# Patient Record
Sex: MALE | Race: WHITE | NOT HISPANIC OR LATINO
[De-identification: names, ages, dates, MRNs, and addresses within clinical notes are randomized per-mention and may not be internally consistent; named-entity substitution may affect disease eponyms.]

---

## 2017-04-02 ENCOUNTER — RESULT REVIEW (OUTPATIENT)
Age: 52
End: 2017-04-02

## 2017-04-03 ENCOUNTER — APPOINTMENT (OUTPATIENT)
Dept: GASTROENTEROLOGY | Facility: HOSPITAL | Age: 52
End: 2017-04-03

## 2017-04-03 ENCOUNTER — OUTPATIENT (OUTPATIENT)
Dept: OUTPATIENT SERVICES | Facility: HOSPITAL | Age: 52
LOS: 1 days | End: 2017-04-03
Payer: COMMERCIAL

## 2017-04-03 DIAGNOSIS — Z12.11 ENCOUNTER FOR SCREENING FOR MALIGNANT NEOPLASM OF COLON: ICD-10-CM

## 2017-04-03 DIAGNOSIS — D12.6 BENIGN NEOPLASM OF COLON, UNSPECIFIED: ICD-10-CM

## 2017-04-03 DIAGNOSIS — K21.9 GASTRO-ESOPHAGEAL REFLUX DISEASE WITHOUT ESOPHAGITIS: ICD-10-CM

## 2017-04-03 PROCEDURE — 88305 TISSUE EXAM BY PATHOLOGIST: CPT

## 2017-04-03 PROCEDURE — 45380 COLONOSCOPY AND BIOPSY: CPT | Mod: PT

## 2017-04-03 PROCEDURE — 88305 TISSUE EXAM BY PATHOLOGIST: CPT | Mod: 26

## 2017-04-03 PROCEDURE — 45380 COLONOSCOPY AND BIOPSY: CPT

## 2017-04-03 PROCEDURE — 43235 EGD DIAGNOSTIC BRUSH WASH: CPT

## 2017-04-04 LAB — SURGICAL PATHOLOGY STUDY: SIGNIFICANT CHANGE UP

## 2017-04-05 PROBLEM — D12.6 COLON ADENOMA: Status: ACTIVE | Noted: 2017-04-05

## 2017-04-27 ENCOUNTER — OTHER (OUTPATIENT)
Age: 52
End: 2017-04-27

## 2017-06-14 ENCOUNTER — MEDICATION RENEWAL (OUTPATIENT)
Age: 52
End: 2017-06-14

## 2018-01-16 ENCOUNTER — MEDICATION RENEWAL (OUTPATIENT)
Age: 53
End: 2018-01-16

## 2018-02-28 ENCOUNTER — APPOINTMENT (OUTPATIENT)
Dept: OTHER | Facility: CLINIC | Age: 53
End: 2018-02-28
Payer: COMMERCIAL

## 2018-02-28 PROCEDURE — 99396 PREV VISIT EST AGE 40-64: CPT

## 2018-02-28 PROCEDURE — 94010 BREATHING CAPACITY TEST: CPT

## 2018-02-28 PROCEDURE — 99214 OFFICE O/P EST MOD 30 MIN: CPT | Mod: 25

## 2018-02-28 PROCEDURE — 96150: CPT

## 2018-02-28 RX ORDER — COLCHICINE 0.6 MG/1
0.6 TABLET ORAL
Qty: 60 | Refills: 0 | Status: COMPLETED | COMMUNITY
Start: 2017-06-13

## 2018-02-28 RX ORDER — HYDROXYCHLOROQUINE SULFATE 200 MG/1
200 TABLET, FILM COATED ORAL
Qty: 60 | Refills: 0 | Status: ACTIVE | COMMUNITY
Start: 2017-08-11

## 2018-02-28 RX ORDER — ACETAMINOPHEN AND CODEINE PHOSPHATE 120; 12 MG/5ML; MG/5ML
120-12 SOLUTION ORAL
Qty: 473 | Refills: 0 | Status: DISCONTINUED | COMMUNITY
Start: 2017-10-02

## 2018-02-28 RX ORDER — LEVOTHYROXINE SODIUM 0.03 MG/1
25 TABLET ORAL
Qty: 90 | Refills: 0 | Status: ACTIVE | COMMUNITY
Start: 2017-05-05

## 2018-02-28 RX ORDER — GABAPENTIN 600 MG/1
600 TABLET, COATED ORAL
Qty: 60 | Refills: 0 | Status: ACTIVE | COMMUNITY
Start: 2017-08-11

## 2018-03-01 LAB
ALBUMIN SERPL ELPH-MCNC: 4.6 G/DL
ALP BLD-CCNC: 95 U/L
ALT SERPL-CCNC: 33 U/L
ANION GAP SERPL CALC-SCNC: 11 MMOL/L
APPEARANCE: CLEAR
AST SERPL-CCNC: 25 U/L
BASOPHILS # BLD AUTO: 0.01 K/UL
BASOPHILS NFR BLD AUTO: 0.2 %
BILIRUB SERPL-MCNC: 0.4 MG/DL
BILIRUBIN URINE: NEGATIVE
BLOOD URINE: NEGATIVE
BUN SERPL-MCNC: 13 MG/DL
CALCIUM SERPL-MCNC: 10 MG/DL
CHLORIDE SERPL-SCNC: 101 MMOL/L
CHOLEST SERPL-MCNC: 166 MG/DL
CHOLEST/HDLC SERPL: 3.6 RATIO
CO2 SERPL-SCNC: 25 MMOL/L
COLOR: ABNORMAL
CREAT SERPL-MCNC: 0.8 MG/DL
EOSINOPHIL # BLD AUTO: 0.05 K/UL
EOSINOPHIL NFR BLD AUTO: 0.9 %
GLUCOSE QUALITATIVE U: NEGATIVE MG/DL
GLUCOSE SERPL-MCNC: 100 MG/DL
HCT VFR BLD CALC: 44.2 %
HDLC SERPL-MCNC: 46 MG/DL
HGB BLD-MCNC: 14.6 G/DL
IMM GRANULOCYTES NFR BLD AUTO: 0.2 %
KETONES URINE: NEGATIVE
LDLC SERPL CALC-MCNC: 102 MG/DL
LEUKOCYTE ESTERASE URINE: NEGATIVE
LYMPHOCYTES # BLD AUTO: 0.88 K/UL
LYMPHOCYTES NFR BLD AUTO: 15.5 %
MAN DIFF?: NORMAL
MCHC RBC-ENTMCNC: 29.7 PG
MCHC RBC-ENTMCNC: 33 GM/DL
MCV RBC AUTO: 90 FL
MONOCYTES # BLD AUTO: 0.19 K/UL
MONOCYTES NFR BLD AUTO: 3.4 %
NEUTROPHILS # BLD AUTO: 4.52 K/UL
NEUTROPHILS NFR BLD AUTO: 79.8 %
NITRITE URINE: NEGATIVE
PH URINE: 7
PLATELET # BLD AUTO: 190 K/UL
POTASSIUM SERPL-SCNC: 4.6 MMOL/L
PROT SERPL-MCNC: 7.9 G/DL
PROTEIN URINE: NEGATIVE MG/DL
RBC # BLD: 4.91 M/UL
RBC # FLD: 13.9 %
SODIUM SERPL-SCNC: 137 MMOL/L
SPECIFIC GRAVITY URINE: 1.01
TRIGL SERPL-MCNC: 89 MG/DL
UROBILINOGEN URINE: NEGATIVE MG/DL
WBC # FLD AUTO: 5.66 K/UL

## 2018-03-21 ENCOUNTER — APPOINTMENT (OUTPATIENT)
Dept: PSYCHIATRY | Facility: CLINIC | Age: 53
End: 2018-03-21

## 2018-04-11 ENCOUNTER — APPOINTMENT (OUTPATIENT)
Dept: PULMONOLOGY | Facility: CLINIC | Age: 53
End: 2018-04-11

## 2018-04-11 ENCOUNTER — APPOINTMENT (OUTPATIENT)
Dept: CT IMAGING | Facility: IMAGING CENTER | Age: 53
End: 2018-04-11

## 2018-04-19 ENCOUNTER — MEDICATION RENEWAL (OUTPATIENT)
Age: 53
End: 2018-04-19

## 2018-04-20 ENCOUNTER — INPATIENT (INPATIENT)
Facility: HOSPITAL | Age: 53
LOS: 3 days | Discharge: HOME | DRG: 247 | End: 2018-04-24
Attending: HOSPITALIST | Admitting: STUDENT IN AN ORGANIZED HEALTH CARE EDUCATION/TRAINING PROGRAM
Payer: MEDICARE

## 2018-04-20 VITALS
OXYGEN SATURATION: 100 % | SYSTOLIC BLOOD PRESSURE: 131 MMHG | DIASTOLIC BLOOD PRESSURE: 95 MMHG | HEART RATE: 76 BPM | RESPIRATION RATE: 18 BRPM

## 2018-04-20 DIAGNOSIS — I21.19 ST ELEVATION (STEMI) MYOCARDIAL INFARCTION INVOLVING OTHER CORONARY ARTERY OF INFERIOR WALL: ICD-10-CM

## 2018-04-20 DIAGNOSIS — M32.9 SYSTEMIC LUPUS ERYTHEMATOSUS, UNSPECIFIED: ICD-10-CM

## 2018-04-20 DIAGNOSIS — E03.9 HYPOTHYROIDISM, UNSPECIFIED: ICD-10-CM

## 2018-04-20 DIAGNOSIS — Z29.9 ENCOUNTER FOR PROPHYLACTIC MEASURES, UNSPECIFIED: ICD-10-CM

## 2018-04-20 DIAGNOSIS — M10.9 GOUT, UNSPECIFIED: ICD-10-CM

## 2018-04-20 DIAGNOSIS — E78.5 HYPERLIPIDEMIA, UNSPECIFIED: ICD-10-CM

## 2018-04-20 DIAGNOSIS — J98.4 OTHER DISORDERS OF LUNG: ICD-10-CM

## 2018-04-20 LAB
ALBUMIN SERPL ELPH-MCNC: 4.2 G/DL — SIGNIFICANT CHANGE UP (ref 3.3–5)
ALBUMIN SERPL ELPH-MCNC: 4.5 G/DL — SIGNIFICANT CHANGE UP (ref 3.3–5)
ALP SERPL-CCNC: 90 U/L — SIGNIFICANT CHANGE UP (ref 40–120)
ALP SERPL-CCNC: 96 U/L — SIGNIFICANT CHANGE UP (ref 40–120)
ALT FLD-CCNC: 24 U/L — SIGNIFICANT CHANGE UP (ref 10–45)
ALT FLD-CCNC: 26 U/L — SIGNIFICANT CHANGE UP (ref 10–45)
ANION GAP SERPL CALC-SCNC: 11 MMOL/L — SIGNIFICANT CHANGE UP (ref 5–17)
ANION GAP SERPL CALC-SCNC: 15 MMOL/L — SIGNIFICANT CHANGE UP (ref 5–17)
APTT BLD: 26.5 SEC — LOW (ref 27.5–37.4)
AST SERPL-CCNC: 21 U/L — SIGNIFICANT CHANGE UP (ref 10–40)
AST SERPL-CCNC: 22 U/L — SIGNIFICANT CHANGE UP (ref 10–40)
BASE EXCESS BLDV CALC-SCNC: 4 MMOL/L — HIGH (ref -2–2)
BASOPHILS # BLD AUTO: 0 K/UL — SIGNIFICANT CHANGE UP (ref 0–0.2)
BASOPHILS NFR BLD AUTO: 0.4 % — SIGNIFICANT CHANGE UP (ref 0–2)
BILIRUB SERPL-MCNC: 0.3 MG/DL — SIGNIFICANT CHANGE UP (ref 0.2–1.2)
BILIRUB SERPL-MCNC: 0.3 MG/DL — SIGNIFICANT CHANGE UP (ref 0.2–1.2)
BUN SERPL-MCNC: 18 MG/DL — SIGNIFICANT CHANGE UP (ref 7–23)
BUN SERPL-MCNC: 19 MG/DL — SIGNIFICANT CHANGE UP (ref 7–23)
CA-I SERPL-SCNC: 1.21 MMOL/L — SIGNIFICANT CHANGE UP (ref 1.12–1.3)
CALCIUM SERPL-MCNC: 9.5 MG/DL — SIGNIFICANT CHANGE UP (ref 8.4–10.5)
CALCIUM SERPL-MCNC: 9.9 MG/DL — SIGNIFICANT CHANGE UP (ref 8.4–10.5)
CHLORIDE BLDV-SCNC: 106 MMOL/L — SIGNIFICANT CHANGE UP (ref 96–108)
CHLORIDE SERPL-SCNC: 102 MMOL/L — SIGNIFICANT CHANGE UP (ref 96–108)
CHLORIDE SERPL-SCNC: 104 MMOL/L — SIGNIFICANT CHANGE UP (ref 96–108)
CK MB BLD-MCNC: 9.8 % — HIGH (ref 0–3.5)
CK MB CFR SERPL CALC: 3.5 NG/ML — SIGNIFICANT CHANGE UP (ref 0–6.7)
CK MB CFR SERPL CALC: 9.8 NG/ML — HIGH (ref 0–6.7)
CK SERPL-CCNC: 100 U/L — SIGNIFICANT CHANGE UP (ref 30–200)
CK SERPL-CCNC: 68 U/L — SIGNIFICANT CHANGE UP (ref 30–200)
CO2 BLDV-SCNC: 28 MMOL/L — SIGNIFICANT CHANGE UP (ref 22–30)
CO2 SERPL-SCNC: 23 MMOL/L — SIGNIFICANT CHANGE UP (ref 22–31)
CO2 SERPL-SCNC: 24 MMOL/L — SIGNIFICANT CHANGE UP (ref 22–31)
CREAT SERPL-MCNC: 0.83 MG/DL — SIGNIFICANT CHANGE UP (ref 0.5–1.3)
CREAT SERPL-MCNC: 0.86 MG/DL — SIGNIFICANT CHANGE UP (ref 0.5–1.3)
EOSINOPHIL # BLD AUTO: 0.1 K/UL — SIGNIFICANT CHANGE UP (ref 0–0.5)
EOSINOPHIL NFR BLD AUTO: 1.2 % — SIGNIFICANT CHANGE UP (ref 0–6)
GAS PNL BLDV: 138 MMOL/L — SIGNIFICANT CHANGE UP (ref 136–145)
GAS PNL BLDV: SIGNIFICANT CHANGE UP
GAS PNL BLDV: SIGNIFICANT CHANGE UP
GLUCOSE BLDV-MCNC: 145 MG/DL — HIGH (ref 70–99)
GLUCOSE SERPL-MCNC: 135 MG/DL — HIGH (ref 70–99)
GLUCOSE SERPL-MCNC: 142 MG/DL — HIGH (ref 70–99)
HCO3 BLDV-SCNC: 27 MMOL/L — SIGNIFICANT CHANGE UP (ref 21–29)
HCT VFR BLD CALC: 43.3 % — SIGNIFICANT CHANGE UP (ref 39–50)
HCT VFR BLD CALC: 44.6 % — SIGNIFICANT CHANGE UP (ref 39–50)
HCT VFR BLDA CALC: 46 % — SIGNIFICANT CHANGE UP (ref 39–50)
HGB BLD CALC-MCNC: 15 G/DL — SIGNIFICANT CHANGE UP (ref 13–17)
HGB BLD-MCNC: 14.9 G/DL — SIGNIFICANT CHANGE UP (ref 13–17)
HGB BLD-MCNC: 15.1 G/DL — SIGNIFICANT CHANGE UP (ref 13–17)
INR BLD: 1.04 RATIO — SIGNIFICANT CHANGE UP (ref 0.88–1.16)
LACTATE BLDV-MCNC: 2 MMOL/L — SIGNIFICANT CHANGE UP (ref 0.7–2)
LYMPHOCYTES # BLD AUTO: 1 K/UL — SIGNIFICANT CHANGE UP (ref 1–3.3)
LYMPHOCYTES # BLD AUTO: 14.8 % — SIGNIFICANT CHANGE UP (ref 13–44)
MCHC RBC-ENTMCNC: 31.2 PG — SIGNIFICANT CHANGE UP (ref 27–34)
MCHC RBC-ENTMCNC: 32.1 PG — SIGNIFICANT CHANGE UP (ref 27–34)
MCHC RBC-ENTMCNC: 33.5 GM/DL — SIGNIFICANT CHANGE UP (ref 32–36)
MCHC RBC-ENTMCNC: 34.8 GM/DL — SIGNIFICANT CHANGE UP (ref 32–36)
MCV RBC AUTO: 92.4 FL — SIGNIFICANT CHANGE UP (ref 80–100)
MCV RBC AUTO: 93.2 FL — SIGNIFICANT CHANGE UP (ref 80–100)
MONOCYTES # BLD AUTO: 0.3 K/UL — SIGNIFICANT CHANGE UP (ref 0–0.9)
MONOCYTES NFR BLD AUTO: 4.1 % — SIGNIFICANT CHANGE UP (ref 2–14)
NEUTROPHILS # BLD AUTO: 5.1 K/UL — SIGNIFICANT CHANGE UP (ref 1.8–7.4)
NEUTROPHILS NFR BLD AUTO: 79.4 % — HIGH (ref 43–77)
NT-PROBNP SERPL-SCNC: 67 PG/ML — SIGNIFICANT CHANGE UP (ref 0–300)
PCO2 BLDV: 34 MMHG — LOW (ref 35–50)
PH BLDV: 7.5 — HIGH (ref 7.35–7.45)
PLATELET # BLD AUTO: 195 K/UL — SIGNIFICANT CHANGE UP (ref 150–400)
PLATELET # BLD AUTO: 201 K/UL — SIGNIFICANT CHANGE UP (ref 150–400)
PO2 BLDV: 16 MMHG — LOW (ref 25–45)
POTASSIUM BLDV-SCNC: 4.4 MMOL/L — SIGNIFICANT CHANGE UP (ref 3.5–5)
POTASSIUM SERPL-MCNC: 3.9 MMOL/L — SIGNIFICANT CHANGE UP (ref 3.5–5.3)
POTASSIUM SERPL-MCNC: 4.6 MMOL/L — SIGNIFICANT CHANGE UP (ref 3.5–5.3)
POTASSIUM SERPL-SCNC: 3.9 MMOL/L — SIGNIFICANT CHANGE UP (ref 3.5–5.3)
POTASSIUM SERPL-SCNC: 4.6 MMOL/L — SIGNIFICANT CHANGE UP (ref 3.5–5.3)
PROT SERPL-MCNC: 7.4 G/DL — SIGNIFICANT CHANGE UP (ref 6–8.3)
PROT SERPL-MCNC: 7.8 G/DL — SIGNIFICANT CHANGE UP (ref 6–8.3)
PROTHROM AB SERPL-ACNC: 11.3 SEC — SIGNIFICANT CHANGE UP (ref 9.8–12.7)
RBC # BLD: 4.68 M/UL — SIGNIFICANT CHANGE UP (ref 4.2–5.8)
RBC # BLD: 4.78 M/UL — SIGNIFICANT CHANGE UP (ref 4.2–5.8)
RBC # FLD: 11.6 % — SIGNIFICANT CHANGE UP (ref 10.3–14.5)
RBC # FLD: 11.7 % — SIGNIFICANT CHANGE UP (ref 10.3–14.5)
RH IG SCN BLD-IMP: NEGATIVE — SIGNIFICANT CHANGE UP
SAO2 % BLDV: 35 % — LOW (ref 67–88)
SODIUM SERPL-SCNC: 138 MMOL/L — SIGNIFICANT CHANGE UP (ref 135–145)
SODIUM SERPL-SCNC: 141 MMOL/L — SIGNIFICANT CHANGE UP (ref 135–145)
TROPONIN T SERPL-MCNC: 0.02 NG/ML — SIGNIFICANT CHANGE UP (ref 0–0.06)
TROPONIN T SERPL-MCNC: 0.08 NG/ML — HIGH (ref 0–0.06)
WBC # BLD: 6.4 K/UL — SIGNIFICANT CHANGE UP (ref 3.8–10.5)
WBC # BLD: 7.2 K/UL — SIGNIFICANT CHANGE UP (ref 3.8–10.5)
WBC # FLD AUTO: 6.4 K/UL — SIGNIFICANT CHANGE UP (ref 3.8–10.5)
WBC # FLD AUTO: 7.2 K/UL — SIGNIFICANT CHANGE UP (ref 3.8–10.5)

## 2018-04-20 PROCEDURE — 99152 MOD SED SAME PHYS/QHP 5/>YRS: CPT

## 2018-04-20 PROCEDURE — 93458 L HRT ARTERY/VENTRICLE ANGIO: CPT | Mod: 26,59

## 2018-04-20 PROCEDURE — 92941 PRQ TRLML REVSC TOT OCCL AMI: CPT | Mod: RC

## 2018-04-20 PROCEDURE — 99285 EMERGENCY DEPT VISIT HI MDM: CPT

## 2018-04-20 PROCEDURE — 93010 ELECTROCARDIOGRAM REPORT: CPT

## 2018-04-20 PROCEDURE — 99223 1ST HOSP IP/OBS HIGH 75: CPT | Mod: GC

## 2018-04-20 RX ORDER — ASPIRIN/CALCIUM CARB/MAGNESIUM 324 MG
243 TABLET ORAL ONCE
Qty: 0 | Refills: 0 | Status: COMPLETED | OUTPATIENT
Start: 2018-04-20 | End: 2018-04-20

## 2018-04-20 RX ORDER — SODIUM CHLORIDE 9 MG/ML
3 INJECTION INTRAMUSCULAR; INTRAVENOUS; SUBCUTANEOUS ONCE
Qty: 0 | Refills: 0 | Status: COMPLETED | OUTPATIENT
Start: 2018-04-20 | End: 2018-04-20

## 2018-04-20 RX ORDER — ASPIRIN/CALCIUM CARB/MAGNESIUM 324 MG
81 TABLET ORAL DAILY
Qty: 0 | Refills: 0 | Status: DISCONTINUED | OUTPATIENT
Start: 2018-04-21 | End: 2018-04-24

## 2018-04-20 RX ORDER — ATORVASTATIN CALCIUM 80 MG/1
80 TABLET, FILM COATED ORAL AT BEDTIME
Qty: 0 | Refills: 0 | Status: DISCONTINUED | OUTPATIENT
Start: 2018-04-20 | End: 2018-04-24

## 2018-04-20 RX ORDER — TICAGRELOR 90 MG/1
90 TABLET ORAL
Qty: 0 | Refills: 0 | Status: DISCONTINUED | OUTPATIENT
Start: 2018-04-20 | End: 2018-04-24

## 2018-04-20 RX ORDER — TICAGRELOR 90 MG/1
180 TABLET ORAL ONCE
Qty: 0 | Refills: 0 | Status: COMPLETED | OUTPATIENT
Start: 2018-04-20 | End: 2018-04-20

## 2018-04-20 RX ORDER — GABAPENTIN 400 MG/1
600 CAPSULE ORAL
Qty: 0 | Refills: 0 | Status: DISCONTINUED | OUTPATIENT
Start: 2018-04-20 | End: 2018-04-24

## 2018-04-20 RX ORDER — HYDROXYCHLOROQUINE SULFATE 200 MG
200 TABLET ORAL
Qty: 0 | Refills: 0 | Status: DISCONTINUED | OUTPATIENT
Start: 2018-04-20 | End: 2018-04-24

## 2018-04-20 RX ORDER — PANTOPRAZOLE SODIUM 20 MG/1
40 TABLET, DELAYED RELEASE ORAL
Qty: 0 | Refills: 0 | Status: DISCONTINUED | OUTPATIENT
Start: 2018-04-20 | End: 2018-04-24

## 2018-04-20 RX ORDER — HEPARIN SODIUM 5000 [USP'U]/ML
5000 INJECTION INTRAVENOUS; SUBCUTANEOUS ONCE
Qty: 0 | Refills: 0 | Status: COMPLETED | OUTPATIENT
Start: 2018-04-20 | End: 2018-04-20

## 2018-04-20 RX ORDER — LEVOTHYROXINE SODIUM 125 MCG
25 TABLET ORAL DAILY
Qty: 0 | Refills: 0 | Status: DISCONTINUED | OUTPATIENT
Start: 2018-04-20 | End: 2018-04-24

## 2018-04-20 RX ADMIN — Medication 243 MILLIGRAM(S): at 13:50

## 2018-04-20 RX ADMIN — GABAPENTIN 600 MILLIGRAM(S): 400 CAPSULE ORAL at 21:16

## 2018-04-20 RX ADMIN — SODIUM CHLORIDE 3 MILLILITER(S): 9 INJECTION INTRAMUSCULAR; INTRAVENOUS; SUBCUTANEOUS at 13:50

## 2018-04-20 RX ADMIN — HEPARIN SODIUM 5000 UNIT(S): 5000 INJECTION INTRAVENOUS; SUBCUTANEOUS at 13:55

## 2018-04-20 RX ADMIN — TICAGRELOR 180 MILLIGRAM(S): 90 TABLET ORAL at 13:53

## 2018-04-20 RX ADMIN — Medication 200 MILLIGRAM(S): at 21:16

## 2018-04-20 NOTE — H&P ADULT - NSHPREVIEWOFSYSTEMS_GEN_ALL_CORE
CONSTITUTIONAL: No weakness, fevers, chills  HEENT: No visual changes  NECK: No pain or stiffness  RESPIRATORY: +Shortness of breath (when speaking, occurs at baseline); No cough, wheezing, hemoptysis  CARDIOVASCULAR: No chest pain or palpitations; No edema  GASTROINTESTINAL: No abdominal pain, n/v/d/c, melena, hematochezia  GENITOURINARY: No dysuria, urinary frequency or hematuria  NEUROLOGICAL: No headache, no numbness or weakness  SKIN: No rashes or lesions  All other review of systems is negative unless indicated above.

## 2018-04-20 NOTE — H&P ADULT - ASSESSMENT
52M with PMH recently-diagnosed SLE (8/2017) on immunosuppressive therapy, gout, HLD, hypothyroidism, and chronic lung disease presenting with acute chest pain, found to have inferior wall STEMI with 100% occlusion of mRCA, now s/p PCI. Admitted to CCU for close monitoring. 52M with PMH recently-diagnosed SLE (8/2017) on immunosuppressive therapy, gout, HLD, hypothyroidism, and chronic lung disease presenting with acute chest pain, found to have inferior wall STEMI with 100% occlusion of mRCA, now s/p PCI with ISABELLA x1 to mRCA. Admitted to CCU for close monitoring.

## 2018-04-20 NOTE — CHART NOTE - NSCHARTNOTEFT_GEN_A_CORE
Removal of Radial Band    Pulses in the right upper extremity are palpable. The patient was placed in the supine position. The insertion site was identified and the band deflated per protocol. The radial band was removed slowly. Direct pressure was applied for 10 minutes.    Monitoring of the right wrist and both upper extremities including neuro-vascular checks and vital signs every 15 minutes x 4.    Complications: None    Comments: Pulses present. No hemorrhage, hematoma or ecchymosis. Pressure dressing placed.

## 2018-04-20 NOTE — H&P ADULT - NSHPLABSRESULTS_GEN_ALL_CORE
Labs:               15.1   6.4   )-----------( 201      ( 20 Apr 2018 13:50 )             43.3     04-20    141  |  102  |  19  ----------------------------<  142<H>  4.6   |  24  |  0.86    Ca    9.9      20 Apr 2018 13:50    TPro  7.8  /  Alb  4.5  /  TBili  0.3  /  DBili  x   /  AST  21  /  ALT  24  /  AlkPhos  96  04-20    PT/INR - ( 20 Apr 2018 13:50 )   PT: 11.3 sec;   INR: 1.04 ratio    PTT - ( 20 Apr 2018 13:50 )  PTT:26.5 sec      ECG:       Telemetry:       Imaging: Labs:               15.1   6.4   )-----------( 201      ( 20 Apr 2018 13:50 )             43.3     04-20    141  |  102  |  19  ----------------------------<  142<H>  4.6   |  24  |  0.86    Ca    9.9      20 Apr 2018 13:50    TPro  7.8  /  Alb  4.5  /  TBili  0.3  /  DBili  x   /  AST  21  /  ALT  24  /  AlkPhos  96  04-20    PT/INR - ( 20 Apr 2018 13:50 )   PT: 11.3 sec;   INR: 1.04 ratio    PTT - ( 20 Apr 2018 13:50 )  PTT:26.5 sec      ECG:     Telemetry: Sinus rhythm, HR 70s    Imaging: None

## 2018-04-20 NOTE — CONSULT NOTE ADULT - SUBJECTIVE AND OBJECTIVE BOX
Registration Time: 13:35  Initial EC:35  Called by ED: 13:42  Saw patient at bedside: 13:43  Called Cath Attendin:53  Balloon Time: 14:14  Door to Balloon time: 39 mins    Patient seen and evaluated at bedside in ED Critical C    Outpatient Cardiologist: Dr. Frederick Stanford    Chief Complaint: Chest pain x 1.5 hours    HPI: 52M w/ PMHx of SLE, Hypothyroid and HLD who p/w acute onset of 6/10 SSCP that radiated down both arms. He described the pain as a, "muscle pain." This is the first time he has experienced this type of pain. Not worse with inspiration. He took ASA 81 at home. EMS gave NTG x 2 - improved the pain. The patient received , Brilinta 180 and Heparin 5000U in the ED    PMHx:   SLE  HLD  Hypothyroid    PSHx:   L wrist surgery    Home Meds: Gabapentin 600 bid, Prednisone 2.5 daily, Levothyroxine 25mcg, Plaquenil 200 daily, Simvastatin 40, Omeprazole 40, Meloxicam 15, ASA 81    Current Meds:   aspirin  chewable 243 milliGRAM(s) Oral once  heparin  Injectable 5000 Unit(s) IV Push once  sodium chloride 0.9% lock flush 3 milliLiter(s) IV Push once  ticagrelor 180 milliGRAM(s) Oral once    Allergies: No Known Allergies    FAMILY HISTORY: Noncontributory    Social History: Retired - was a  and a   Smoking History: denied  Alcohol Use: denied  Drug Use: denied    REVIEW OF SYSTEMS:  CONSTITUTIONAL: No weakness, fevers or chills  EYES/ENT: No visual changes;  No dysphagia  NECK: No pain or stiffness  RESPIRATORY: No cough, wheezing, hemoptysis; No shortness of breath  CARDIOVASCULAR: + chest pain or palpitations; No lower extremity edema  GASTROINTESTINAL: No abdominal or epigastric pain. No nausea, vomiting, or hematemesis; No diarrhea or constipation. No melena or hematochezia.  BACK: No back pain  GENITOURINARY: No dysuria, frequency or hematuria  NEUROLOGICAL: No numbness or weakness  SKIN: No itching, burning, rashes, or lesions   All other review of systems is negative unless indicated above.    Physical Exam:  T(F): no Temp documented  HR: 76 (on my exam)  BP: 141/85 (on my exam)  RR: 24 (on my exam)  SpO2: 99% on RA (on my exam)    GENERAL: Pt in a significant amount of pain  HEAD:  Atraumatic, Normocephalic  ENT: EOMI, PERRLA, conjunctiva and sclera clear, Neck supple, No JVD, moist mucosa  CHEST/LUNG: Clear to auscultation bilaterally; No wheeze, equal breath sounds bilaterally   BACK: No spinal tenderness  HEART: Regular rate and rhythm; No murmurs, rubs, or gallops  ABDOMEN: Soft, Nontender, Nondistended; Bowel sounds present  EXTREMITIES:  No clubbing, cyanosis, or edema  PSYCH: Nl behavior, nl affect  NEUROLOGY: AAOx3, non-focal, cranial nerves intact  SKIN: Normal color, No rashes or lesions  LINES: PIV    Cardiovascular Diagnostic Testing:    ECG: Personally reviewed NSR @ 73, nl axis, nl intervals KARLA in II, III, avf, TWI in avL, STD in I    Labs: Personally reviewed                        15.1   6.4   )-----------( 201      ( 2018 13:50 )             43.3   PT/INR - ( 2018 13:50 )   PT: 11.3 sec;   INR: 1.04 ratio      Remainder of labs pending

## 2018-04-20 NOTE — CONSULT NOTE ADULT - ASSESSMENT
52M w/ PMHx of SLE, Hypothyroid and HLD who p/w acute IWSTEMI, s/p ASA, Brilinta and Heparin in ED - taken to cath lab - 100% mRCA, door to balloon 39mins    Plan:    IWSTEMI:  -c/w ASA 81 daily and Brilinta 90 bid (ensure that insurance covers)  -start Lipitor 80 tonight  -B-blocker / ACEI as tolerated  -TTE  -TSH, FLP, HbA1c  -f/u official Cath report  -Admit to CCU    Ijeoma (p): [phone]

## 2018-04-20 NOTE — ED PROVIDER NOTE - OBJECTIVE STATEMENT
Baljinder Jean MD (resident): 52 M w/ Hx of lupus, [facility], who p/w substernal CP x2 hr when taking tires off the car. Rad to bilateral shoulders. A/w diaphoresis. No SOB or syncope or nausea. Took ASA 81 mg prior to arrival. EMS gave NTG x1, found inferior wall STEMI in 1 EKG.

## 2018-04-20 NOTE — H&P ADULT - NSHPPHYSICALEXAM_GEN_ALL_CORE
ICU Vital Signs Last 24 Hrs:  T(C): 36.8 (20 Apr 2018 14:50), Max: 36.8 (20 Apr 2018 14:50)  T(F): 98.2 (20 Apr 2018 14:50), Max: 98.2 (20 Apr 2018 14:50)  HR: 84 (20 Apr 2018 15:15) (84 - 86)  BP: 95/64 (20 Apr 2018 15:15) (94/69 - 98/64)  BP(mean): 74 (20 Apr 2018 15:15) (74 - 82)  ABP: --  ABP(mean): --  RR: 9 (20 Apr 2018 15:15) (9 - 20)  SpO2: 95% (20 Apr 2018 15:15) (95% - 99%)    PHYSICAL EXAM:   General: Comfortable, NAD  HEENT: EOMI, PERRLA, sclera and conjunctiva clear; dentition normal  Neck: Supple, no JVD  Chest/Lungs: Clear to auscultation bilaterally; no rales, rhonchi, wheezing, or rubs  Heart: Regular rate and rhythm; normal S1/S2; no murmurs, rubs, or gallops  Abdomen: Soft, nondistended, nontender, normoactive bowel sounds  Extremities: PT/DP pulses 2+ bilaterally, no LE edema, clubbing or cyanosis  Skin: Warm, well-perfused; no rashes or lesions  Neurologic:  AOx3, motor strength 5/5 in B/L UE and LE, sensation grossly intact  Psych: Appropriate mood and affect ICU Vital Signs Last 24 Hrs:  T(C): 36.8 (20 Apr 2018 14:50), Max: 36.8 (20 Apr 2018 14:50)  T(F): 98.2 (20 Apr 2018 14:50), Max: 98.2 (20 Apr 2018 14:50)  HR: 84 (20 Apr 2018 15:15) (84 - 86)  BP: 95/64 (20 Apr 2018 15:15) (94/69 - 98/64)  BP(mean): 74 (20 Apr 2018 15:15) (74 - 82)  ABP: --  ABP(mean): --  RR: 9 (20 Apr 2018 15:15) (9 - 20)  SpO2: 95% (20 Apr 2018 15:15) (95% - 99%)    PHYSICAL EXAM:   General: Comfortable, NAD  HEENT: EOMI, PERRLA, sclera and conjunctiva clear; dentition normal  Neck: Supple, no JVD  Chest/Lungs: Clear to auscultation bilaterally; no rales, rhonchi, wheezing, or rubs  Heart: Regular rate and rhythm; normal S1/S2; no murmurs, rubs, or gallops  Abdomen: Soft, nondistended, nontender, normoactive bowel sounds  Extremities: PT/DP pulses 2+ bilaterally, no LE edema, clubbing or cyanosis  Skin: Warm, well-perfused; R radial band in place  Neurologic: AOx3, motor strength 5/5 in B/L UE and LE, sensation grossly intact  Psych: Appropriate mood and affect

## 2018-04-20 NOTE — H&P ADULT - PROBLEM SELECTOR PLAN 2
- Continue prednisone 2.5mg daily, hydroxychloroquine 200mg BID - Continue prednisone 2.5mg daily  - Resume hydroxychloroquine 200mg BID tomorrow

## 2018-04-20 NOTE — ED PROVIDER NOTE - ATTENDING CONTRIBUTION TO CARE
pt is a 53 y/o male with h/o sle, lung disease from 9/11 with chest pain with pleuritic chest pain while changing tires, but pleurititc in nature, ekg prehospital last one with elevation with asa given, diaphoretic prehospital with recurrent chest pain, first ekg with no stemi second with inf wall elevation, cards fellow aware for cath lab for stemi.

## 2018-04-20 NOTE — ED ADULT NURSE NOTE - OBJECTIVE STATEMENT
52 year old male alert and oriented x4 came to ED c/o pain across his chest and radiating to b/l arms.  Patient said pain began about an hour and a half prior to arrival.  Patient was working in his garage and was moving a tire when the pain began.  Patient said pain is 7/10 sharp pain and chest is tender to palpation.   Patient received 1 nitro by EMS and said pain has improved right after, but pain returned.  IV established by EMS in left hand #20 and IV established in ED in right AC #18.  Patient given meds as verbally ordered by Cardiologist.  Cardiology to bedside and patient transported on CM with EDT, Cardiologist and RN to Cath Lab.

## 2018-04-20 NOTE — H&P ADULT - PROBLEM SELECTOR PLAN 1
Inferior wall STEMI s/p PCI. Currently asymptomatic. Hemodynamically stable.   - Continue ASA 81mg daily, ticagrelor 90mg BID  - Will start atorvastatin 80mg tonight  - TTE pending Inferior wall STEMI s/p PCI with ISABELLA x1 to mRCA. Currently asymptomatic. Hemodynamically stable.   - Continue ASA 81mg daily, ticagrelor 90mg BID  - Will start atorvastatin 80mg tonight  - TTE ordered  - Telemetry monitoring Inferior wall STEMI s/p PCI with ISABELLA x1 to mRCA. Currently asymptomatic. Hemodynamically stable.   - Continue ASA 81mg daily, ticagrelor 90mg BID  - Will start atorvastatin 80mg tonight  - TTE ordered  - Telemetry monitoring  - Trend cardiac enzymes  - Plan for staged cath of LAD in upcoming days Inferior wall STEMI s/p PCI with ISABELLA x1 to mRCA. Currently asymptomatic. Hemodynamically stable.   - Continue ASA 81mg daily, ticagrelor 90mg BID  - Will start atorvastatin 80mg tonight  - TTE ordered  - Telemetry monitoring  - Trend cardiac enzymes  - Plan for staged cath of LAD in setting of SLE prior to discharge

## 2018-04-20 NOTE — H&P ADULT - PMH
Chronic lung disease  [place] of work as  in 9/11  Gout    Hyperlipidemia, unspecified hyperlipidemia type    Hypothyroidism, unspecified type    Systemic lupus erythematosus, unspecified SLE type, unspecified organ involvement status

## 2018-04-20 NOTE — H&P ADULT - HISTORY OF PRESENT ILLNESS
52M with PMH recently-diagnosed SLE (8/2017) on immunosuppressive therapy, gout, hypothyroidism who presents from home with acute chest pain. 52M with PMH recently-diagnosed SLE (8/2017) on immunosuppressive therapy, gout, HLD, hypothyroidism, and chronic lung disease from work as  in 9/11 brought in by EMS from home with acute chest pain. Patient was outside changing the tires on his car when he developed dull 6/10 substernal chest pain that radiated down both arms. He continued to work outside for approx. 30 minutes before becoming diaphoretic prompting him to go inside to rest. He initially thought his pain was "muscle pain", however, when it did not resolve with rest, he took a baby aspirin and called EMS. En route, he was given NTG x2 with some degree of relief.     Upon arrival to [facility] ED, he was noted to be in moderate distress d/t pain. ECG on arrival showed NSR, HR 70 with no acute ischemic changes. Cardiology was called for persistent sxs. Repeat ECG revealed new ST elevations in II, III, aVF and TWI in aVL, ST depressions in lead I. He was given ASA 243mg, Brilinta 180mg and 5000 units heparin and brought emergently to cath lab for inferior wall STEMI where he was found to have 100% mRCA occlusion. He was revascularized and brought to CCU for close monitoring post-MI. 52M with PMH recently-diagnosed SLE (8/2017) on immunosuppressive therapy, gout, HLD, hypothyroidism, and chronic lung disease from work as  in 9/11 brought in by EMS from home with acute chest pain. Patient was outside changing the tires on his car when he developed dull 6/10 chest pain that radiated across his chest and down both arms. He continued to work outside for approx. 30 minutes before becoming diaphoretic prompting him to go inside to rest. He initially thought his pain was "muscle pain", however, when it did not resolve with rest, he took a baby aspirin and called EMS. En route, he was given NTG x1 with some degree of relief.     Upon arrival to [facility] ED, he was noted to be in moderate distress d/t pain. ECG showed NSR, HR 70 with no acute ischemic changes. Cardiology was called for persistent sxs. Repeat ECG revealed new ST elevations in II, III, aVF and TWI in aVL, ST depressions in lead I. He was given ASA 243mg, Brilinta 180mg and 5000 units heparin, and brought emergently to cath lab for inferior wall STEMI where he was found to have 100% mRCA occlusion. He was revascularized and brought to CCU for close monitoring post-MI.

## 2018-04-20 NOTE — ED PROVIDER NOTE - PHYSICAL EXAMINATION
Physical Exam: middle aged M who is in moderate distress, no diaphoresis, but tearing eyes, AAOx3, NCAT, MMM, neck is supple, PERRL, CTAB, normal rate and regular rhythm, abdomen is soft and NTND, No edema, No deformity of extremities, No rashes, CN grossly intact, No focal motor or sensory deficits. Equal pulses in all 4 extremities ~ Baljinder Jean MD

## 2018-04-20 NOTE — CHART NOTE - NSCHARTNOTEFT_GEN_A_CORE
====================  CCU MIDNIGHT ROUNDS  ====================    MATTY ALMARAZ  [id number]  Patient is a 52y old  Male who presents with a chief complaint of Chest pain, STEMI (20 Apr 2018 15:13)      ====================  SUMMARY: 52M with PMH recently-diagnosed SLE (8/2017) on immunosuppressive therapy, gout, HLD, hypothyroidism, and chronic lung disease presenting with acute chest pain, found to have inferior wall STEMI with 100% occlusion of mRCA, now s/p PCI with ISABELLA x1 to mRCA. Admitted to CCU for close monitoring.   ====================        ====================  NEW EVENTS: Patient resting comfortably in bed, watching TV. Denies any chest pain or shortness of breath. Right radial site without any hematoma.   ====================        ====================  VITALS (Last 12 hrs):  ====================    T(C): 36.8 (04-20-18 @ 19:00), Max: 36.8 (04-20-18 @ 14:50)  T(F): 98.2 (04-20-18 @ 19:00), Max: 98.2 (04-20-18 @ 14:50)  HR: 66 (04-20-18 @ 21:00) (64 - 88)  BP: 92/52 (04-20-18 @ 21:00) (92/52 - 141/85)  BP(mean): 64 (04-20-18 @ 21:00) (64 - 84)  ABP: --  ABP(mean): --  RR: 17 (04-20-18 @ 21:00) (14 - 22)  SpO2: 98% (04-20-18 @ 21:00) (95% - 100%)  Wt(kg): --  CVP(mm Hg): --  CVP(cm H2O): --  CO: --  CI: --  PA: --  PA(mean): --  PCWP: --  SVR: --  PVR: --    I&O's Summary    20 Apr 2018 07:01  -  20 Apr 2018 22:25  --------------------------------------------------------  IN: 60 mL / OUT: 1100 mL / NET: -1040 mL            ====================  NEW LABS:  ====================                          14.9   7.2   )-----------( 195      ( 20 Apr 2018 15:54 )             44.6     04-20    138  |  104  |  18  ----------------------------<  135<H>  3.9   |  23  |  0.83    Ca    9.5      20 Apr 2018 15:54    TPro  7.4  /  Alb  4.2  /  TBili  0.3  /  DBili  x   /  AST  22  /  ALT  26  /  AlkPhos  90  04-20    PT/INR - ( 20 Apr 2018 13:50 )   PT: 11.3 sec;   INR: 1.04 ratio         PTT - ( 20 Apr 2018 13:50 )  PTT:26.5 sec  Creatine Kinase, Serum: 100 U/L (04-20-18 @ 15:54)  Troponin T, Serum: 0.08 ng/mL <H> (04-20-18 @ 15:54)  Creatine Kinase, Serum: 68 U/L (04-20-18 @ 13:50)  Troponin T, Serum: 0.02 ng/mL (04-20-18 @ 13:50)    CKMB Units: 9.8 ng/mL (04-20 @ 15:54)  CKMB Units: 3.5 ng/mL (04-20 @ 13:50)          ====================  PLAN:  ====================  - Inferior wall STEMI s/p PCI with ISABELLA x1 to mRCA. Currently asymptomatic. Hemodynamically stable.   - Continue ASA 81mg daily, ticagrelor 90mg BID  - Right radial band removed this evening, no evidence of hematoma.   - Continue with atorvastatin 80mg tonight  - TTE ordered  - Telemetry monitoring  - Trend cardiac enzymes  - Plan for staged cath of LAD in setting of SLE prior to discharge.   - Continue prednisone 2.5mg daily  - Continue hydroxychloroquine 200mg BID    - Continue levothyroxine 25mcg daily.       Vesna Acuña MD  PGY2 Internal Medicine Resident  Pager: [phone] - [place]  [phone] - [facility]

## 2018-04-20 NOTE — ED PROVIDER NOTE - PROGRESS NOTE DETAILS
Baljinder Jean MD (resident): 2nd EMS EKG w/ ST elevation in inferior leads. 1st ED EKG w/o STEMI, but pt still symptomatic, so cards called. 2nd ED EKG w/ STEMI, taken to cath.

## 2018-04-20 NOTE — H&P ADULT - NSHPSOCIALHISTORY_GEN_ALL_CORE
Lives at home with wife and children. Retired . Denies prior or current tobacco use. Occasional EtOH use. No illicit drug use.

## 2018-04-21 LAB
ALBUMIN SERPL ELPH-MCNC: 4 G/DL — SIGNIFICANT CHANGE UP (ref 3.3–5)
ALP SERPL-CCNC: 92 U/L — SIGNIFICANT CHANGE UP (ref 40–120)
ALT FLD-CCNC: 28 U/L — SIGNIFICANT CHANGE UP (ref 10–45)
ANION GAP SERPL CALC-SCNC: 12 MMOL/L — SIGNIFICANT CHANGE UP (ref 5–17)
AST SERPL-CCNC: 56 U/L — HIGH (ref 10–40)
BILIRUB SERPL-MCNC: 0.5 MG/DL — SIGNIFICANT CHANGE UP (ref 0.2–1.2)
BUN SERPL-MCNC: 13 MG/DL — SIGNIFICANT CHANGE UP (ref 7–23)
CALCIUM SERPL-MCNC: 9.7 MG/DL — SIGNIFICANT CHANGE UP (ref 8.4–10.5)
CHLORIDE SERPL-SCNC: 104 MMOL/L — SIGNIFICANT CHANGE UP (ref 96–108)
CK MB BLD-MCNC: 12.7 % — HIGH (ref 0–3.5)
CK MB BLD-MCNC: 12.8 % — HIGH (ref 0–3.5)
CK MB CFR SERPL CALC: 44.1 NG/ML — HIGH (ref 0–6.7)
CK MB CFR SERPL CALC: 61.9 NG/ML — HIGH (ref 0–6.7)
CK SERPL-CCNC: 346 U/L — HIGH (ref 30–200)
CK SERPL-CCNC: 482 U/L — HIGH (ref 30–200)
CO2 SERPL-SCNC: 25 MMOL/L — SIGNIFICANT CHANGE UP (ref 22–31)
CREAT SERPL-MCNC: 0.84 MG/DL — SIGNIFICANT CHANGE UP (ref 0.5–1.3)
GLUCOSE SERPL-MCNC: 98 MG/DL — SIGNIFICANT CHANGE UP (ref 70–99)
HCT VFR BLD CALC: 46.8 % — SIGNIFICANT CHANGE UP (ref 39–50)
HGB BLD-MCNC: 15.4 G/DL — SIGNIFICANT CHANGE UP (ref 13–17)
MAGNESIUM SERPL-MCNC: 1.9 MG/DL — SIGNIFICANT CHANGE UP (ref 1.6–2.6)
MCHC RBC-ENTMCNC: 30.5 PG — SIGNIFICANT CHANGE UP (ref 27–34)
MCHC RBC-ENTMCNC: 32.9 GM/DL — SIGNIFICANT CHANGE UP (ref 32–36)
MCV RBC AUTO: 92.7 FL — SIGNIFICANT CHANGE UP (ref 80–100)
PHOSPHATE SERPL-MCNC: 2.9 MG/DL — SIGNIFICANT CHANGE UP (ref 2.5–4.5)
PLATELET # BLD AUTO: 186 K/UL — SIGNIFICANT CHANGE UP (ref 150–400)
POTASSIUM SERPL-MCNC: 4.2 MMOL/L — SIGNIFICANT CHANGE UP (ref 3.5–5.3)
POTASSIUM SERPL-SCNC: 4.2 MMOL/L — SIGNIFICANT CHANGE UP (ref 3.5–5.3)
PROT SERPL-MCNC: 7.2 G/DL — SIGNIFICANT CHANGE UP (ref 6–8.3)
RBC # BLD: 5.04 M/UL — SIGNIFICANT CHANGE UP (ref 4.2–5.8)
RBC # FLD: 11.8 % — SIGNIFICANT CHANGE UP (ref 10.3–14.5)
SODIUM SERPL-SCNC: 141 MMOL/L — SIGNIFICANT CHANGE UP (ref 135–145)
TROPONIN T SERPL-MCNC: 1.15 NG/ML — HIGH (ref 0–0.06)
TROPONIN T SERPL-MCNC: 1.58 NG/ML — HIGH (ref 0–0.06)
WBC # BLD: 5.2 K/UL — SIGNIFICANT CHANGE UP (ref 3.8–10.5)
WBC # FLD AUTO: 5.2 K/UL — SIGNIFICANT CHANGE UP (ref 3.8–10.5)

## 2018-04-21 PROCEDURE — 93010 ELECTROCARDIOGRAM REPORT: CPT

## 2018-04-21 PROCEDURE — 93306 TTE W/DOPPLER COMPLETE: CPT | Mod: 26

## 2018-04-21 PROCEDURE — 99233 SBSQ HOSP IP/OBS HIGH 50: CPT | Mod: GC

## 2018-04-21 PROCEDURE — 99223 1ST HOSP IP/OBS HIGH 75: CPT

## 2018-04-21 RX ORDER — ACETAMINOPHEN 500 MG
650 TABLET ORAL ONCE
Qty: 0 | Refills: 0 | Status: COMPLETED | OUTPATIENT
Start: 2018-04-21 | End: 2018-04-21

## 2018-04-21 RX ORDER — METOPROLOL TARTRATE 50 MG
12.5 TABLET ORAL
Qty: 0 | Refills: 0 | Status: DISCONTINUED | OUTPATIENT
Start: 2018-04-21 | End: 2018-04-23

## 2018-04-21 RX ADMIN — Medication 25 MICROGRAM(S): at 05:57

## 2018-04-21 RX ADMIN — Medication 650 MILLIGRAM(S): at 11:00

## 2018-04-21 RX ADMIN — Medication 650 MILLIGRAM(S): at 12:00

## 2018-04-21 RX ADMIN — Medication 12.5 MILLIGRAM(S): at 20:39

## 2018-04-21 RX ADMIN — PANTOPRAZOLE SODIUM 40 MILLIGRAM(S): 20 TABLET, DELAYED RELEASE ORAL at 05:58

## 2018-04-21 RX ADMIN — ATORVASTATIN CALCIUM 80 MILLIGRAM(S): 80 TABLET, FILM COATED ORAL at 21:24

## 2018-04-21 RX ADMIN — GABAPENTIN 600 MILLIGRAM(S): 400 CAPSULE ORAL at 20:39

## 2018-04-21 RX ADMIN — TICAGRELOR 90 MILLIGRAM(S): 90 TABLET ORAL at 05:58

## 2018-04-21 RX ADMIN — TICAGRELOR 90 MILLIGRAM(S): 90 TABLET ORAL at 23:05

## 2018-04-21 RX ADMIN — GABAPENTIN 600 MILLIGRAM(S): 400 CAPSULE ORAL at 05:58

## 2018-04-21 RX ADMIN — Medication 81 MILLIGRAM(S): at 11:26

## 2018-04-21 RX ADMIN — Medication 200 MILLIGRAM(S): at 05:58

## 2018-04-21 RX ADMIN — Medication 5 MILLIGRAM(S): at 08:42

## 2018-04-21 RX ADMIN — Medication 200 MILLIGRAM(S): at 20:40

## 2018-04-21 NOTE — CHART NOTE - NSCHARTNOTEFT_GEN_A_CORE
CCU TRANSFER NOTE    Admission date: 4/20/18  Chief complaint/ Diagnosis: IWSTEMI  HPI: 52M with PMH recently-diagnosed SLE (8/2017) on immunosuppressive therapy, gout, HLD, hypothyroidism, and chronic lung disease from work as  in 9/11 brought in by EMS from home with acute chest pain. S/P tiffanie to 100% mRCA occlusion.     Interval history: Uneventful overnight  CE peaked     REVIEW OF SYSTEMS  Denies CP, Palpitation, SOB, Dyspnea [x  ] All other systems negative    MEDICATIONS  (STANDING)  aspirin enteric coated 81 milliGRAM(s) Oral daily  atorvastatin 80 milliGRAM(s) Oral at bedtime  gabapentin 600 milliGRAM(s) Oral two times a day  hydroxychloroquine 200 milliGRAM(s) Oral two times a day  levothyroxine 25 MICROGram(s) Oral daily  metoprolol tartrate 12.5 milliGRAM(s) Oral two times a day  pantoprazole    Tablet 40 milliGRAM(s) Oral before breakfast  predniSONE   Tablet 5 milliGRAM(s) Oral daily  ticagrelor 90 milliGRAM(s) Oral two times a day    Allergy: No Known Allergieshydroxychloroquine    ICU Vital Signs Last 24 Hrs  T(C): 36.2 (Max: 36.8 )  HR: 72  (64 - 90)  BP: 97/63(89/53 - 141/85)  RR: 18  (10 - 22)  SpO2: 96% (95% - 100%) in room air     I&O's Summary  IN: 180 mL / OUT: 1750 mL / NET: -1570 mL    PHYSICAL EXAM  EYES: EOMI, PERRLA, conjunctiva and sclera clear  NECK: Supple, No JVD  CHEST/LUNG: Clear to auscultation bilaterally; No wheezing  HEART: Normal S1, S2. No murmurs, rubs, or gallops  ABDOMEN: + Bowel sounds, Soft, NT, ND   EXTREMITIES:  2+ Peripheral Pulses, No clubbing, cyanosis, or edema  NEUROLOGY: non-focal, aaox3  SKIN: No rashes or lesions    Interpretation of Telemetry: NSR                        15.4   5.2   )-----------( 186                   46.8     141  |  104  |  13  -------------------------<  98  4.2   |  25  |  0.84    Ca    9.7     Phos  2.9Mg     1.9        TPro  7.2  /  Alb  4.0  /  TBili  0.5  /  DBili  x   /  AST  56<H>  /  ALT  28  /  AlkPhos  92      CARDIAC MARKERS ( 21 Apr 2018 06:38 )  x     / 1.15 ng/mL / 346 U/L / x     / 44.1 ng/mL  CARDIAC MARKERS ( 21 Apr 2018 00:29 )  x     / 1.58 ng/mL / 482 U/L / x     / 61.9 ng/mL  CARDIAC MARKERS ( 20 Apr 2018 15:54 )  x     / 0.08 ng/mL / 100 U/L / x     / 9.8 ng/mL  CARDIAC MARKERS ( 20 Apr 2018 13:50 )  x     / 0.02 ng/mL / 68 U/L / x     / 3.5 ng/mL    A/P 52M with PMH recently-diagnosed SLE (8/2017) on immunosuppressive therapy, gout, HLD, hypothyroidism, and chronic lung disease from work as  in 9/11 brought in by EMS from home with acute chest pain. S/P tiffanie to 100% mRCA occlusion.   IWSTEMI s/p TIFFANIE to m RCA  Cont. ASA, Brilinta, Lipitor, Metoprolol  Staging LAD Monday  Follow up w/ ECHO  lUPUS: Cont. Prednisone and hydroxychloroquine  Pt will be transferred to the floor. Report given to Dr. Macie King.

## 2018-04-21 NOTE — PROGRESS NOTE ADULT - ASSESSMENT
52M with PMH recently-diagnosed SLE (8/2017) on immunosuppressive therapy, gout, HLD, hypothyroidism, and chronic lung disease from work as  in 9/11 brought in by EMSw/ CP s/p ISABELLA to  mRCA(100%).

## 2018-04-21 NOTE — PROGRESS NOTE ADULT - SUBJECTIVE AND OBJECTIVE BOX
CCU NOTE    Admission date: 4/20/18  Chief complaint/ Diagnosis: IWSTEMI  HPI: 52M with PMH recently-diagnosed SLE (8/2017) on immunosuppressive therapy, gout, HLD, hypothyroidism, and chronic lung disease from work as  in 9/11 brought in by EMSw/ CP s/p ISABELLA to  [facility](100%).     Interval history: Uneventful overnight  CE peaked  Remains hemodynamically stable and CP free    REVIEW OF SYSTEMS  Denies CP, Palpitation, SOB, Dyspnea [ x ] All other systems negative    MEDICATIONS  (STANDING)  aspirin enteric coated 81 milliGRAM(s) Oral daily  atorvastatin 80 milliGRAM(s) Oral at bedtime  gabapentin 600 milliGRAM(s) Oral two times a day  hydroxychloroquine 200 milliGRAM(s) Oral two times a day  levothyroxine 25 MICROGram(s) Oral daily  pantoprazole    Tablet 40 milliGRAM(s) Oral before breakfast  predniSONE   Tablet 5 milliGRAM(s) Oral daily  ticagrelor 90 milliGRAM(s) Oral two times a day    Allergy: No Known Allergies    ICU Vital Signs Last 24 Hrs  T(C): 36.2 (Max: 36.8)  HR: 84  (64 - 88)  BP: 97/58 (89/53 - 141/85)  RR: 21 (10 - 22)  SpO2: 97%  (95% - 100%)    I&O's Summary  IN: 180 mL / OUT: 1750 mL / NET: -1570 mL    PHYSICAL EXAM  Appearance: Normal, NAD  EYES: EOMI, PERRLA, conjunctiva and sclera clear  NECK: Supple, No JVD  CHEST/LUNG: Clear to auscultation bilaterally; No wheezing  HEART: Normal S1, S2. No murmurs, rubs, or gallops  ABDOMEN: + Bowel sounds, Soft, NT, ND   EXTREMITIES:  2+ Peripheral Pulses, No clubbing, cyanosis, or edema  NEUROLOGY: non-focal, aaox3  SKIN: No rashes or lesions    Interpretation of Telemetry: NSR                     15.4   5.2   )-----------( 186                 46.8     141  |  104  |  13  ------------------------<  98  4.2   |  25  |  0.84    Ca    9.7     Phos  2.9    Mg     1.9      TPro  7.2  /  Alb  4.0  /  TBili  0.5  /  DBili  x   /  AST  56<H>  /  ALT  28  /  AlkPhos  92  04-21      CARDIAC MARKERS ( 21 Apr 2018 06:38 )  x     / 1.15 ng/mL / 346 U/L / x     / 44.1 ng/mL  CARDIAC MARKERS ( 21 Apr 2018 00:29 )  x     / 1.58 ng/mL / 482 U/L / x     / 61.9 ng/mL  CARDIAC MARKERS ( 20 Apr 2018 15:54 )  x     / 0.08 ng/mL / 100 U/L / x     / 9.8 ng/mL  CARDIAC MARKERS ( 20 Apr 2018 13:50 )  x     / 0.02 ng/mL / 68 U/L / x     / 3.5 ng/mL CCU NOTE    Admission date: 4/20/18  Chief complaint/ Diagnosis: IWSTEMI  HPI: 52M with PMH recently-diagnosed SLE (8/2017) on immunosuppressive therapy, gout, HLD, hypothyroidism, and chronic lung disease from work as  in 9/11 brought in by EMSw/ CP s/p ISABELLA to  [facility](100%).     Interval history: Uneventful overnight  CE peaked  Remains hemodynamically stable and CP free    REVIEW OF SYSTEMS  Denies CP, Palpitation, SOB, Dyspnea [ x ] All other systems negative    MEDICATIONS  (STANDING)  aspirin enteric coated 81 milliGRAM(s) Oral daily  atorvastatin 80 milliGRAM(s) Oral at bedtime  gabapentin 600 milliGRAM(s) Oral two times a day  hydroxychloroquine 200 milliGRAM(s) Oral two times a day  levothyroxine 25 MICROGram(s) Oral daily  pantoprazole    Tablet 40 milliGRAM(s) Oral before breakfast  predniSONE   Tablet 5 milliGRAM(s) Oral daily  ticagrelor 90 milliGRAM(s) Oral two times a day    Allergy: No Known Allergies    ICU Vital Signs Last 24 Hrs  T(C): 36.2 (Max: 36.8)  HR: 84  (64 - 88)  BP: 97/58 (89/53 - 141/85)  RR: 21 (10 - 22)  SpO2: 97%  (95% - 100%)    I&O's Summary  IN: 180 mL / OUT: 1750 mL / NET: -1570 mL    PHYSICAL EXAM  Appearance: Normal, NAD  EYES: EOMI, PERRLA, conjunctiva and sclera clear  NECK: Supple, No JVD  CHEST/LUNG: Clear to auscultation bilaterally; No wheezing  HEART: Normal S1, S2. No murmurs, rubs, or gallops  ABDOMEN: + Bowel sounds, Soft, NT, ND   EXTREMITIES:  2+ Peripheral Pulses, No clubbing, cyanosis, or edema  NEUROLOGY: non-focal, aaox3  SKIN: No rashes or lesions, right radial no hematoma or bleeding    Interpretation of Telemetry: NSR                     15.4   5.2   )-----------( 186                 46.8     141  |  104  |  13  ------------------------<  98  4.2   |  25  |  0.84    Ca    9.7     Phos  2.9    Mg     1.9      TPro  7.2  /  Alb  4.0  /  TBili  0.5  /  DBili  x   /  AST  56<H>  /  ALT  28  /  AlkPhos  92  04-21      CARDIAC MARKERS ( 21 Apr 2018 06:38 )  x     / 1.15 ng/mL / 346 U/L / x     / 44.1 ng/mL  CARDIAC MARKERS ( 21 Apr 2018 00:29 )  x     / 1.58 ng/mL / 482 U/L / x     / 61.9 ng/mL  CARDIAC MARKERS ( 20 Apr 2018 15:54 )  x     / 0.08 ng/mL / 100 U/L / x     / 9.8 ng/mL  CARDIAC MARKERS ( 20 Apr 2018 13:50 )  x     / 0.02 ng/mL / 68 U/L / x     / 3.5 ng/mL

## 2018-04-22 DIAGNOSIS — I21.11 ST ELEVATION (STEMI) MYOCARDIAL INFARCTION INVOLVING RIGHT CORONARY ARTERY: ICD-10-CM

## 2018-04-22 DIAGNOSIS — K08.89 OTHER SPECIFIED DISORDERS OF TEETH AND SUPPORTING STRUCTURES: ICD-10-CM

## 2018-04-22 DIAGNOSIS — R73.03 PREDIABETES: ICD-10-CM

## 2018-04-22 LAB
ALBUMIN SERPL ELPH-MCNC: 4.2 G/DL — SIGNIFICANT CHANGE UP (ref 3.3–5)
ALP SERPL-CCNC: 107 U/L — SIGNIFICANT CHANGE UP (ref 40–120)
ALT FLD-CCNC: 25 U/L — SIGNIFICANT CHANGE UP (ref 10–45)
ANION GAP SERPL CALC-SCNC: 12 MMOL/L — SIGNIFICANT CHANGE UP (ref 5–17)
AST SERPL-CCNC: 31 U/L — SIGNIFICANT CHANGE UP (ref 10–40)
BILIRUB SERPL-MCNC: 0.6 MG/DL — SIGNIFICANT CHANGE UP (ref 0.2–1.2)
BUN SERPL-MCNC: 13 MG/DL — SIGNIFICANT CHANGE UP (ref 7–23)
CALCIUM SERPL-MCNC: 10.2 MG/DL — SIGNIFICANT CHANGE UP (ref 8.4–10.5)
CHLORIDE SERPL-SCNC: 103 MMOL/L — SIGNIFICANT CHANGE UP (ref 96–108)
CHOLEST SERPL-MCNC: 175 MG/DL — SIGNIFICANT CHANGE UP (ref 10–199)
CK MB BLD-MCNC: 7.1 % — HIGH (ref 0–3.5)
CK MB CFR SERPL CALC: 7.2 NG/ML — HIGH (ref 0–6.7)
CK SERPL-CCNC: 102 U/L — SIGNIFICANT CHANGE UP (ref 30–200)
CO2 SERPL-SCNC: 26 MMOL/L — SIGNIFICANT CHANGE UP (ref 22–31)
CREAT SERPL-MCNC: 0.93 MG/DL — SIGNIFICANT CHANGE UP (ref 0.5–1.3)
GLUCOSE SERPL-MCNC: 94 MG/DL — SIGNIFICANT CHANGE UP (ref 70–99)
HBA1C BLD-MCNC: 5.8 % — HIGH (ref 4–5.6)
HCT VFR BLD CALC: 44.1 % — SIGNIFICANT CHANGE UP (ref 39–50)
HDLC SERPL-MCNC: 30 MG/DL — LOW (ref 40–125)
HGB BLD-MCNC: 15.4 G/DL — SIGNIFICANT CHANGE UP (ref 13–17)
LIPID PNL WITH DIRECT LDL SERPL: 118 MG/DL — SIGNIFICANT CHANGE UP
MAGNESIUM SERPL-MCNC: 2 MG/DL — SIGNIFICANT CHANGE UP (ref 1.6–2.6)
MCHC RBC-ENTMCNC: 31 PG — SIGNIFICANT CHANGE UP (ref 27–34)
MCHC RBC-ENTMCNC: 34.9 GM/DL — SIGNIFICANT CHANGE UP (ref 32–36)
MCV RBC AUTO: 88.9 FL — SIGNIFICANT CHANGE UP (ref 80–100)
PHOSPHATE SERPL-MCNC: 3.3 MG/DL — SIGNIFICANT CHANGE UP (ref 2.5–4.5)
PLATELET # BLD AUTO: 193 K/UL — SIGNIFICANT CHANGE UP (ref 150–400)
POTASSIUM SERPL-MCNC: 4.2 MMOL/L — SIGNIFICANT CHANGE UP (ref 3.5–5.3)
POTASSIUM SERPL-SCNC: 4.2 MMOL/L — SIGNIFICANT CHANGE UP (ref 3.5–5.3)
PROT SERPL-MCNC: 8 G/DL — SIGNIFICANT CHANGE UP (ref 6–8.3)
RBC # BLD: 4.96 M/UL — SIGNIFICANT CHANGE UP (ref 4.2–5.8)
RBC # FLD: 13.4 % — SIGNIFICANT CHANGE UP (ref 10.3–14.5)
SODIUM SERPL-SCNC: 141 MMOL/L — SIGNIFICANT CHANGE UP (ref 135–145)
TOTAL CHOLESTEROL/HDL RATIO MEASUREMENT: 5.8 RATIO — SIGNIFICANT CHANGE UP (ref 3.4–9.6)
TRIGL SERPL-MCNC: 135 MG/DL — SIGNIFICANT CHANGE UP (ref 10–149)
TROPONIN T SERPL-MCNC: 0.75 NG/ML — HIGH (ref 0–0.06)
WBC # BLD: 5.5 K/UL — SIGNIFICANT CHANGE UP (ref 3.8–10.5)
WBC # FLD AUTO: 5.5 K/UL — SIGNIFICANT CHANGE UP (ref 3.8–10.5)

## 2018-04-22 PROCEDURE — 99233 SBSQ HOSP IP/OBS HIGH 50: CPT

## 2018-04-22 RX ORDER — ACETAMINOPHEN 500 MG
650 TABLET ORAL EVERY 6 HOURS
Qty: 0 | Refills: 0 | Status: DISCONTINUED | OUTPATIENT
Start: 2018-04-22 | End: 2018-04-24

## 2018-04-22 RX ADMIN — ATORVASTATIN CALCIUM 80 MILLIGRAM(S): 80 TABLET, FILM COATED ORAL at 21:19

## 2018-04-22 RX ADMIN — Medication 12.5 MILLIGRAM(S): at 18:56

## 2018-04-22 RX ADMIN — Medication 200 MILLIGRAM(S): at 05:18

## 2018-04-22 RX ADMIN — Medication 81 MILLIGRAM(S): at 18:56

## 2018-04-22 RX ADMIN — Medication 200 MILLIGRAM(S): at 21:19

## 2018-04-22 RX ADMIN — Medication 25 MICROGRAM(S): at 05:20

## 2018-04-22 RX ADMIN — Medication 12.5 MILLIGRAM(S): at 05:19

## 2018-04-22 RX ADMIN — GABAPENTIN 600 MILLIGRAM(S): 400 CAPSULE ORAL at 05:19

## 2018-04-22 RX ADMIN — GABAPENTIN 600 MILLIGRAM(S): 400 CAPSULE ORAL at 18:56

## 2018-04-22 RX ADMIN — Medication 5 MILLIGRAM(S): at 05:19

## 2018-04-22 RX ADMIN — TICAGRELOR 90 MILLIGRAM(S): 90 TABLET ORAL at 05:29

## 2018-04-22 RX ADMIN — TICAGRELOR 90 MILLIGRAM(S): 90 TABLET ORAL at 18:56

## 2018-04-22 RX ADMIN — PANTOPRAZOLE SODIUM 40 MILLIGRAM(S): 20 TABLET, DELAYED RELEASE ORAL at 05:20

## 2018-04-22 NOTE — PROGRESS NOTE ADULT - SUBJECTIVE AND OBJECTIVE BOX
24H hour events: No acute events overnight. Awaiting procedure tomorrow     MEDICATIONS:  aspirin enteric coated 81 milliGRAM(s) Oral daily  metoprolol tartrate 12.5 milliGRAM(s) Oral two times a day  ticagrelor 90 milliGRAM(s) Oral two times a day  hydroxychloroquine 200 milliGRAM(s) Oral two times a day  gabapentin 600 milliGRAM(s) Oral two times a day  pantoprazole    Tablet 40 milliGRAM(s) Oral before breakfast  atorvastatin 80 milliGRAM(s) Oral at bedtime  levothyroxine 25 MICROGram(s) Oral daily  predniSONE   Tablet 5 milliGRAM(s) Oral daily  REVIEW OF SYSTEMS:  Complete 10point ROS negative except as documented above.    PHYSICAL EXAM:  T(C): 36.8 (04-22-18 @ 13:37), Max: 36.8 (04-22-18 @ 13:37)  HR: 109 (04-22-18 @ 13:37) (71 - 109)  BP: 109/74 (04-22-18 @ 13:37) (96/58 - 116/73)  RR: 18 (04-22-18 @ 13:37) (16 - 18)  SpO2: 96% (04-22-18 @ 13:37) (96% - 98%)  Wt(kg): --  I&O's Summary    21 Apr 2018 07:01  -  22 Apr 2018 07:00  --------------------------------------------------------  IN: 680 mL / OUT: 650 mL / NET: 30 mL    Appearance: Normal	  HEENT:   Normal oral mucosa, PERRL, EOMI	  Lymphatic: No lymphadenopathy  Cardiovascular: Normal S1 S2, No JVD, No murmurs, No edema  Respiratory: Lungs clear to auscultation	  Psychiatry: A & O x 3, Mood & affect appropriate  Gastrointestinal:  Soft, Non-tender, + BS	  Skin: No rashes, No ecchymoses, No cyanosis	  Neurologic: Non-focal  Extremities: Normal range of motion, No clubbing, cyanosis or edema  Vascular: Peripheral pulses palpable 2+ bilaterally    LABS:	 	    CBC Full  -  ( 22 Apr 2018 08:27 )  WBC Count : 5.50 K/uL  Hemoglobin : 15.4 g/dL  Hematocrit : 44.1 %  Platelet Count - Automated : 193 K/uL  Mean Cell Volume : 88.9 fl  Mean Cell Hemoglobin : 31.0 pg  Mean Cell Hemoglobin Concentration : 34.9 gm/dL  Auto Neutrophil # : x  Auto Lymphocyte # : x  Auto Monocyte # : x  Auto Eosinophil # : x  Auto Basophil # : x  Auto Neutrophil % : x  Auto Lymphocyte % : x  Auto Monocyte % : x  Auto Eosinophil % : x  Auto Basophil % : x    04-22    141  |  103  |  13  ----------------------------<  94  4.2   |  26  |  0.93  04-21    141  |  104  |  13  ----------------------------<  98  4.2   |  25  |  0.84    Ca    10.2      22 Apr 2018 06:54  Ca    9.7      21 Apr 2018 06:38  Phos  3.3     04-22  Phos  2.9     04-21  Mg     2.0     04-22  Mg     1.9     04-21    TPro  8.0  /  Alb  4.2  /  TBili  0.6  /  DBili  x   /  AST  31  /  ALT  25  /  AlkPhos  107  04-22  TPro  7.2  /  Alb  4.0  /  TBili  0.5  /  DBili  x   /  AST  56<H>  /  ALT  28  /  AlkPhos  92  04-21    proBNP: Serum Pro-Brain Natriuretic Peptide: 67 pg/mL (04-20 @ 13:50)  TELEMETRY: 	    ECG:  	  RADIOLOGY:  OTHER: 	    PREVIOUS DIAGNOSTIC TESTING:    [ ] Echocardiogram:  [ ]  Catheterization:  [ ] Stress Test:  	  	  ASSESSMENT/PLAN:

## 2018-04-22 NOTE — PROGRESS NOTE ADULT - ASSESSMENT
52M with PMH recently-diagnosed SLE (8/2017) on immunosuppressive therapy, gout, HLD, hypothyroidism, and chronic lung disease from work as  in 9/11 brought in by EMSw/ CP s/p ISABELLA to  [facility](100%), now pending stage to LAD.   -c/w aspirin and ticagrelor 90 bid  -c/w metoprolol 12.5 bid   -c/w atorvastin 80 mg daily  -hold ACEI given contrast procedure tomm but will start it after   -plan for C stage to LAD tomorrow.    Please call the covering cardiology fellow at [phone] if any further questions should arise.

## 2018-04-22 NOTE — CHART NOTE - NSCHARTNOTEFT_GEN_A_CORE
Patient is a 52y old  Male who presents with a chief complaint of Chest pain.      History of Present Illness:   51 yo M w/ hx of SLE, HLD, gout, hypothyroidism presents with exertional CP that was 6/10, constant, not relieved with rest, and radiating to jaw with diaphoresis. The pain did not radiate to back. He came to our ER and was found to have on repeat EKG inferior wall STEMI. He was taken to cath where 100% RCA lesion was intervened on w/ ISABELLA x1. He also was found to have 60% stenosis of LAD. Post cath, he had no complications. Currently, patient on telemetry medicine floor and resting without any pain. He feels no symptoms.     Medications:MEDICATIONS  (STANDING):  aspirin enteric coated 81 milliGRAM(s) Oral daily  atorvastatin 80 milliGRAM(s) Oral at bedtime  gabapentin 600 milliGRAM(s) Oral two times a day  hydroxychloroquine 200 milliGRAM(s) Oral two times a day  levothyroxine 25 MICROGram(s) Oral daily  metoprolol tartrate 12.5 milliGRAM(s) Oral two times a day  pantoprazole    Tablet 40 milliGRAM(s) Oral before breakfast  predniSONE   Tablet 5 milliGRAM(s) Oral daily  ticagrelor 90 milliGRAM(s) Oral two times a day    MEDICATIONS  (PRN):      Allergies: Allergies  No Known Allergies        PMHx: as above     Surgical Hx: no significant surgeries     Family Hx: Father had MI in 40s     Social Hx: Patient reports no prior or current use of tobacco products, illicit drugs, or alcohol consumption.    REVIEW OF SYSTEMS (currently)     CONSTITUTIONAL: No weakness, fevers or chills  ENMT:  No ear pain, No vertigo or throat pain  EYES: No visual changes  or photophobia  NECK: No pain or stiffness  RESPIRATORY: No cough, wheezing, hemoptysis; No shortness of breath  CARDIOVASCULAR: No chest pain or palpitations  GASTROINTESTINAL: No abdominal or epigastric pain. No nausea, vomiting, or hematemesis; No diarrhea or constipation. No melena or hematochezia.  MUSCULOSKELETAL: No joint swelling  or warmth.  GENITOURINARY: No dysuria, frequency or hematuria  NEUROLOGICAL: No numbness or weakness  PSYCHIATRIC: No depression or anhedonia.  ENDOCRINE: No sx hypoglycemic episodes.  SKIN: No itching, burning, rashes, or lesions       PHYSICAL EXAM:  Vital Signs Last 24 Hrs  T(C): 36.5 (04-21-18 @ 21:35)  T(F): 97.7 (04-21-18 @ 21:35), Max: 98.1 (04-21-18 @ 06:00)  HR: 71 (04-21-18 @ 21:35) (68 - 90)  BP: 100/64 (04-21-18 @ 21:35)  BP(mean): 80 (04-21-18 @ 15:00) (62 - 80)  RR: 18 (04-21-18 @ 21:35) (10 - 21)  SpO2: 98% (04-21-18 @ 21:35) (90% - 98%)  Wt(kg): --    04-20 @ 07:01  -  04-21 @ 07:00  --------------------------------------------------------  IN: 180 mL / OUT: 1750 mL / NET: -1570 mL    04-21 @ 07:01  -  04-22 @ 00:43  --------------------------------------------------------  IN: 480 mL / OUT: 650 mL / NET: -170 mL      Constitutional: NAD, awake and alert  EYES: EOMI  ENT:  Normal Hearing, no tonsillar exudates   Neck: Soft and supple , no thyromegaly   Respiratory: Breath sounds are clear bilaterally, No wheezing, rales or rhonchi  Cardiovascular: S1 and S2, regular rate and rhythm, no Murmurs, gallops or rubs, no JVD,    Gastrointestinal: Bowel Sounds present, soft, nontender, nondistended, no guarding, no rebound  Extremities: No cyanosis or clubbing; warm to touch  Vascular: 2+ peripheral pulses lower ex; radial pulses intact bilaterally.   Neurological: No focal deficits, CN II-XII intact bilaterally, sensation to light touch intact in all extremities, gait intact.  Musculoskeletal: 5/5 strength b/l upper and lower extremities; no joint swelling.  Skin: No rashes  Psych: no depression or anhedonia, AAOx3  HEME: no bruises, no nose bleeds      Consultant(s) Notes Reviewed:  [x ] YES  [ ] NO  Care Discussed with Consultants/Other Providers [ x] YES  [ ] NO  Name of Consultant  LABS:                        15.4   5.2   )-----------( 186      ( 21 Apr 2018 06:38 )             46.8     04-21    141  |  104  |  13  ----------------------------<  98  4.2   |  25  |  0.84    Ca    9.7      21 Apr 2018 06:38  Phos  2.9     04-21  Mg     1.9     04-21    TPro  7.2  /  Alb  4.0  /  TBili  0.5  /  DBili  x   /  AST  56<H>  /  ALT  28  /  AlkPhos  92  04-21    PT/INR - ( 20 Apr 2018 13:50 )   PT: 11.3 sec;   INR: 1.04 ratio        PTT - ( 20 Apr 2018 13:50 )  PTT:26.5 sec    I personally reviewed & interpreted the lab findings above; CBC and CMP unremarkable largely; CE downtrended   I personally reviewed & interpreted the radiographic findings; CXR pending   I personally reviewed & interpreted the EKG findings; inferior wall STEMI       51 yo M w/ hx of SLE, HLD, gout, hypothyroidism presents with exertional CP  found to have IWSTEMI    1) STEMI  -c/w aspirin and ticagrelor   -c/w metoprolol and statin therapy  -staged PCI monday per cards note  -Start ACEi after cath if blood pressure and renal function allows.  -check A1c and lipid panel    2) Chronic systolic congestive heart failure  -Based on recent TTE, mild LV segment dysfunction  -Euvolemic on exam, no need for diuresis now  -c/w statin, beta-blockers.    3) Systemic erythematous lupus   -no signs/sx of flare   -C/w steroid  -c/w hydroxychloroquine Patient is a 52y old  Male who presents with a chief complaint of Chest pain.      History of Present Illness:   51 yo M w/ hx of SLE, HLD, gout, hypothyroidism presents with exertional CP that was 6/10, constant, not relieved with rest, and radiating to jaw with diaphoresis. The pain did not radiate to back. He came to our ER and was found to have on repeat EKG inferior wall STEMI. He was taken to cath where 100% RCA lesion was intervened on w/ ISABELLA x1. He also was found to have 60% stenosis of LAD. Post cath, he had no complications. Currently, patient on telemetry medicine floor and resting without any pain. He feels no symptoms.     Medications:MEDICATIONS  (STANDING):  aspirin enteric coated 81 milliGRAM(s) Oral daily  atorvastatin 80 milliGRAM(s) Oral at bedtime  gabapentin 600 milliGRAM(s) Oral two times a day  hydroxychloroquine 200 milliGRAM(s) Oral two times a day  levothyroxine 25 MICROGram(s) Oral daily  metoprolol tartrate 12.5 milliGRAM(s) Oral two times a day  pantoprazole    Tablet 40 milliGRAM(s) Oral before breakfast  predniSONE   Tablet 5 milliGRAM(s) Oral daily  ticagrelor 90 milliGRAM(s) Oral two times a day    MEDICATIONS  (PRN):      Allergies: Allergies  No Known Allergies        PMHx: as above     Surgical Hx: no significant surgeries     Family Hx: Father had MI in 40s     Social Hx: Patient reports no prior or current use of tobacco products, illicit drugs, or alcohol consumption.    REVIEW OF SYSTEMS (currently)     CONSTITUTIONAL: No weakness, fevers or chills  ENMT:  No ear pain, No vertigo or throat pain  EYES: No visual changes  or photophobia  NECK: No pain or stiffness  RESPIRATORY: No cough, wheezing, hemoptysis; No shortness of breath  CARDIOVASCULAR: No chest pain or palpitations  GASTROINTESTINAL: No abdominal or epigastric pain. No nausea, vomiting, or hematemesis; No diarrhea or constipation. No melena or hematochezia.  MUSCULOSKELETAL: No joint swelling  or warmth.  GENITOURINARY: No dysuria, frequency or hematuria  NEUROLOGICAL: No numbness or weakness  PSYCHIATRIC: No depression or anhedonia.  ENDOCRINE: No sx hypoglycemic episodes.  SKIN: No itching, burning, rashes, or lesions       PHYSICAL EXAM:  Vital Signs Last 24 Hrs  T(C): 36.5 (04-21-18 @ 21:35)  T(F): 97.7 (04-21-18 @ 21:35), Max: 98.1 (04-21-18 @ 06:00)  HR: 71 (04-21-18 @ 21:35) (68 - 90)  BP: 100/64 (04-21-18 @ 21:35)  BP(mean): 80 (04-21-18 @ 15:00) (62 - 80)  RR: 18 (04-21-18 @ 21:35) (10 - 21)  SpO2: 98% (04-21-18 @ 21:35) (90% - 98%)  Wt(kg): --    04-20 @ 07:01  -  04-21 @ 07:00  --------------------------------------------------------  IN: 180 mL / OUT: 1750 mL / NET: -1570 mL    04-21 @ 07:01  -  04-22 @ 00:43  --------------------------------------------------------  IN: 480 mL / OUT: 650 mL / NET: -170 mL      Constitutional: NAD, awake and alert  EYES: EOMI  ENT:  Normal Hearing, no tonsillar exudates   Neck: Soft and supple , no thyromegaly   Respiratory: Breath sounds are clear bilaterally, No wheezing, rales or rhonchi  Cardiovascular: S1 and S2, regular rate and rhythm, no Murmurs, gallops or rubs, no JVD,    Gastrointestinal: Bowel Sounds present, soft, nontender, nondistended, no guarding, no rebound  Extremities: No cyanosis or clubbing; warm to touch  Vascular: 2+ peripheral pulses lower ex; radial pulses intact bilaterally.   Neurological: No focal deficits, CN II-XII intact bilaterally, sensation to light touch intact in all extremities, gait intact.  Musculoskeletal: 5/5 strength b/l upper and lower extremities; no joint swelling.  Skin: No rashes  Psych: no depression or anhedonia, AAOx3  HEME: no bruises, no nose bleeds      Consultant(s) Notes Reviewed:  [x ] YES  [ ] NO  Care Discussed with Consultants/Other Providers [ x] YES  [ ] NO  Name of Consultant  LABS:                        15.4   5.2   )-----------( 186      ( 21 Apr 2018 06:38 )             46.8     04-21    141  |  104  |  13  ----------------------------<  98  4.2   |  25  |  0.84    Ca    9.7      21 Apr 2018 06:38  Phos  2.9     04-21  Mg     1.9     04-21    TPro  7.2  /  Alb  4.0  /  TBili  0.5  /  DBili  x   /  AST  56<H>  /  ALT  28  /  AlkPhos  92  04-21    PT/INR - ( 20 Apr 2018 13:50 )   PT: 11.3 sec;   INR: 1.04 ratio        PTT - ( 20 Apr 2018 13:50 )  PTT:26.5 sec    I personally reviewed & interpreted the lab findings above; CBC and CMP unremarkable largely; CE downtrended   I personally reviewed & interpreted the radiographic findings; CXR pending   I personally reviewed & interpreted the EKG findings; inferior wall STEMI       51 yo M w/ hx of SLE, HLD, gout, hypothyroidism presents with exertional CP  found to have IWSTEMI    1) STEMI  -c/w aspirin and ticagrelor   -c/w metoprolol and statin therapy  -staged PCI monday per cards note  -Start ACEi after cath if blood pressure and renal function allows.  -check A1c and lipid panel    2) Chronic systolic congestive heart failure  -Based on recent TTE, mild LV segment dysfunction  -Euvolemic on exam, no need for diuresis now  -c/w statin, beta-blockers.    3) Systemic erythematous lupus   -no signs/sx of flare   -C/w steroid  -c/w hydroxychloroquine    I was physically present for the key portions of the evaluation and management (E/M) service provided. Patient seen and examined initially at 11:00PM on 4/21/18.     70 minutes spent on total encounter; more than 50% of the visit was spent counseling and/or coordinating care by the attending physician. Patient is a 52y old  Male who presents with a chief complaint of Chest pain.      History of Present Illness:   53 yo M w/ hx of SLE, HLD, gout, hypothyroidism presents with exertional CP that was 6/10, constant, not relieved with rest, and radiating to jaw with diaphoresis. The pain did not radiate to back. He came to our ER and was found to have on repeat EKG inferior wall STEMI. He was taken to cath where 100% RCA lesion was intervened on w/ ISABELLA x1. He also was found to have 60% stenosis of LAD. Post cath, he had no complications. Currently, patient on telemetry medicine floor and resting without any pain. He feels no symptoms.     Medications:MEDICATIONS  (STANDING):  aspirin enteric coated 81 milliGRAM(s) Oral daily  atorvastatin 80 milliGRAM(s) Oral at bedtime  gabapentin 600 milliGRAM(s) Oral two times a day  hydroxychloroquine 200 milliGRAM(s) Oral two times a day  levothyroxine 25 MICROGram(s) Oral daily  metoprolol tartrate 12.5 milliGRAM(s) Oral two times a day  pantoprazole    Tablet 40 milliGRAM(s) Oral before breakfast  predniSONE   Tablet 5 milliGRAM(s) Oral daily  ticagrelor 90 milliGRAM(s) Oral two times a day    MEDICATIONS  (PRN):      Allergies: Allergies  No Known Allergies        PMHx: as above     Surgical Hx: no significant surgeries     Family Hx: Father had MI in 40s     Social Hx: Patient reports no prior or current use of tobacco products, illicit drugs, or alcohol consumption.    REVIEW OF SYSTEMS (currently)     CONSTITUTIONAL: No weakness, fevers or chills  ENMT:  No ear pain, No vertigo or throat pain  EYES: No visual changes  or photophobia  NECK: No pain or stiffness  RESPIRATORY: No cough, wheezing, hemoptysis; No shortness of breath  CARDIOVASCULAR: No chest pain or palpitations  GASTROINTESTINAL: No abdominal or epigastric pain. No nausea, vomiting, or hematemesis; No diarrhea or constipation. No melena or hematochezia.  MUSCULOSKELETAL: No joint swelling  or warmth.  GENITOURINARY: No dysuria, frequency or hematuria  NEUROLOGICAL: No numbness or weakness  PSYCHIATRIC: No depression or anhedonia.  ENDOCRINE: No sx hypoglycemic episodes.  SKIN: No itching, burning, rashes, or lesions       PHYSICAL EXAM:  Vital Signs Last 24 Hrs  T(C): 36.5 (04-21-18 @ 21:35)  T(F): 97.7 (04-21-18 @ 21:35), Max: 98.1 (04-21-18 @ 06:00)  HR: 71 (04-21-18 @ 21:35) (68 - 90)  BP: 100/64 (04-21-18 @ 21:35)  BP(mean): 80 (04-21-18 @ 15:00) (62 - 80)  RR: 18 (04-21-18 @ 21:35) (10 - 21)  SpO2: 98% (04-21-18 @ 21:35) (90% - 98%)  Wt(kg): --    04-20 @ 07:01  -  04-21 @ 07:00  --------------------------------------------------------  IN: 180 mL / OUT: 1750 mL / NET: -1570 mL    04-21 @ 07:01  -  04-22 @ 00:43  --------------------------------------------------------  IN: 480 mL / OUT: 650 mL / NET: -170 mL      Constitutional: NAD, awake and alert  EYES: EOMI  ENT:  Normal Hearing, no tonsillar exudates   Neck: Soft and supple , no thyromegaly   Respiratory: Breath sounds are clear bilaterally, No wheezing, rales or rhonchi  Cardiovascular: S1 and S2, regular rate and rhythm, no Murmurs, gallops or rubs, no JVD,    Gastrointestinal: Bowel Sounds present, soft, nontender, nondistended, no guarding, no rebound  Extremities: No cyanosis or clubbing; warm to touch  Vascular: 2+ peripheral pulses lower ex; radial pulses intact bilaterally.   Neurological: No focal deficits, CN II-XII intact bilaterally, sensation to light touch intact in all extremities, gait intact.  Musculoskeletal: 5/5 strength b/l upper and lower extremities; no joint swelling.  Skin: No rashes  Psych: no depression or anhedonia, AAOx3  HEME: no bruises, no nose bleeds      Consultant(s) Notes Reviewed:  [x ] YES  [ ] NO  Care Discussed with Consultants/Other Providers [ x] YES  [ ] NO  Name of Consultant  LABS:                        15.4   5.2   )-----------( 186      ( 21 Apr 2018 06:38 )             46.8     04-21    141  |  104  |  13  ----------------------------<  98  4.2   |  25  |  0.84    Ca    9.7      21 Apr 2018 06:38  Phos  2.9     04-21  Mg     1.9     04-21    TPro  7.2  /  Alb  4.0  /  TBili  0.5  /  DBili  x   /  AST  56<H>  /  ALT  28  /  AlkPhos  92  04-21    PT/INR - ( 20 Apr 2018 13:50 )   PT: 11.3 sec;   INR: 1.04 ratio        PTT - ( 20 Apr 2018 13:50 )  PTT:26.5 sec    I personally reviewed & interpreted the lab findings above; CBC and CMP unremarkable largely; CE downtrended   I personally reviewed & interpreted the radiographic findings; CXR pending   I personally reviewed & interpreted the EKG findings; inferior wall STEMI       53 yo M w/ hx of SLE, HLD, gout, hypothyroidism presents with exertional CP  found to have IWSTEMI    1) STEMI  -c/w aspirin and ticagrelor   -c/w metoprolol and statin therapy  -staged PCI monday per cards note  -Start ACEi after cath if blood pressure and renal function allows.  -check A1c and lipid panel    2) Chronic systolic congestive heart failure  -Based on recent TTE, mild LV segment dysfunction  -Euvolemic on exam, no need for diuresis now  -c/w statin, beta-blockers.    3) Systemic erythematous lupus   -no signs/sx of flare   -C/w steroid  -c/w hydroxychloroquine    I was physically present for the key portions of the evaluation and management (E/M) service provided. Patient seen and examined initially at 11:00PM on 4/21/18.     70 minutes spent on total encounter; more than 50% of the visit was spent counseling and/or coordinating care by the attending physician.  Case d/w NP Kylah barrios Patient is a 52y old  Male who presents with a chief complaint of Chest pain.      History of Present Illness:   51 yo M w/ hx of SLE, HLD, gout, hypothyroidism presents with exertional CP that was 6/10, constant, not relieved with rest, and radiating to jaw with diaphoresis. The pain did not radiate to back. He came to our ER and was found to have on repeat EKG inferior wall STEMI. He was taken to cath where 100% RCA lesion was intervened on w/ ISABELLA x1. He also was found to have 60% stenosis of LAD. Post cath, he had no complications. Currently, patient on telemetry medicine floor and resting without any pain. He feels no symptoms.     Medications:MEDICATIONS  (STANDING):  aspirin enteric coated 81 milliGRAM(s) Oral daily  atorvastatin 80 milliGRAM(s) Oral at bedtime  gabapentin 600 milliGRAM(s) Oral two times a day  hydroxychloroquine 200 milliGRAM(s) Oral two times a day  levothyroxine 25 MICROGram(s) Oral daily  metoprolol tartrate 12.5 milliGRAM(s) Oral two times a day  pantoprazole    Tablet 40 milliGRAM(s) Oral before breakfast  predniSONE   Tablet 5 milliGRAM(s) Oral daily  ticagrelor 90 milliGRAM(s) Oral two times a day    MEDICATIONS  (PRN):      Allergies: Allergies  No Known Allergies        PMHx: as above     Surgical Hx: no significant surgeries     Family Hx: Father had MI in 40s     Social Hx: Patient reports no prior or current use of tobacco products, illicit drugs, or alcohol consumption.    REVIEW OF SYSTEMS (currently)     CONSTITUTIONAL: No weakness, fevers or chills  ENMT:  No ear pain, No vertigo or throat pain  EYES: No visual changes  or photophobia  NECK: No pain or stiffness  RESPIRATORY: No cough, wheezing, hemoptysis; No shortness of breath  CARDIOVASCULAR: No chest pain or palpitations  GASTROINTESTINAL: No abdominal or epigastric pain. No nausea, vomiting, or hematemesis; No diarrhea or constipation. No melena or hematochezia.  MUSCULOSKELETAL: No joint swelling  or warmth.  GENITOURINARY: No dysuria, frequency or hematuria  NEUROLOGICAL: No numbness or weakness  PSYCHIATRIC: No depression or anhedonia.  ENDOCRINE: No sx hypoglycemic episodes.  SKIN: No itching, burning, rashes, or lesions       PHYSICAL EXAM:  Vital Signs Last 24 Hrs  T(C): 36.5 (04-21-18 @ 21:35)  T(F): 97.7 (04-21-18 @ 21:35), Max: 98.1 (04-21-18 @ 06:00)  HR: 71 (04-21-18 @ 21:35) (68 - 90)  BP: 100/64 (04-21-18 @ 21:35)  BP(mean): 80 (04-21-18 @ 15:00) (62 - 80)  RR: 18 (04-21-18 @ 21:35) (10 - 21)  SpO2: 98% (04-21-18 @ 21:35) (90% - 98%)  Wt(kg): --    04-20 @ 07:01  -  04-21 @ 07:00  --------------------------------------------------------  IN: 180 mL / OUT: 1750 mL / NET: -1570 mL    04-21 @ 07:01  -  04-22 @ 00:43  --------------------------------------------------------  IN: 480 mL / OUT: 650 mL / NET: -170 mL      Constitutional: NAD, awake and alert  EYES: EOMI  ENT:  Normal Hearing, no tonsillar exudates   Neck: Soft and supple , no thyromegaly   Respiratory: Breath sounds are clear bilaterally, No wheezing, rales or rhonchi  Cardiovascular: S1 and S2, regular rate and rhythm, no Murmurs, gallops or rubs, no JVD,    Gastrointestinal: Bowel Sounds present, soft, nontender, nondistended, no guarding, no rebound  Extremities: No cyanosis or clubbing; warm to touch  Vascular: 2+ peripheral pulses lower ex; radial pulses intact bilaterally.   Neurological: No focal deficits, CN II-XII intact bilaterally, sensation to light touch intact in all extremities, gait intact.  Musculoskeletal: 5/5 strength b/l upper and lower extremities; no joint swelling.  Skin: No rashes  Psych: no depression or anhedonia, AAOx3  HEME: no bruises, no nose bleeds      Consultant(s) Notes Reviewed:  [x ] YES  [ ] NO  Care Discussed with Consultants/Other Providers [ x] YES  [ ] NO  Name of Consultant  LABS:                        15.4   5.2   )-----------( 186      ( 21 Apr 2018 06:38 )             46.8     04-21    141  |  104  |  13  ----------------------------<  98  4.2   |  25  |  0.84    Ca    9.7      21 Apr 2018 06:38  Phos  2.9     04-21  Mg     1.9     04-21    TPro  7.2  /  Alb  4.0  /  TBili  0.5  /  DBili  x   /  AST  56<H>  /  ALT  28  /  AlkPhos  92  04-21    PT/INR - ( 20 Apr 2018 13:50 )   PT: 11.3 sec;   INR: 1.04 ratio        PTT - ( 20 Apr 2018 13:50 )  PTT:26.5 sec    I personally reviewed & interpreted the lab findings above; CBC and CMP unremarkable largely; CE downtrended   I personally reviewed & interpreted the radiographic findings; CXR pending   I personally reviewed & interpreted the EKG findings; inferior wall STEMI       51 yo M w/ hx of SLE, HLD, gout, hypothyroidism presents with exertional CP  found to have IWSTEMI    1) ST elevation myocardial infarction involving right coronary artery   -c/w aspirin and ticagrelor   -c/w metoprolol and statin therapy  -staged PCI monday per cards note  -Start ACEi after cath if blood pressure and renal function allows.  -check A1c and lipid panel    2) Chronic systolic congestive heart failure  -Based on recent TTE, mild LV segment dysfunction  -Euvolemic on exam, no need for diuresis now  -c/w statin, beta-blockers.    3) Systemic lupus erythematous, unspecified   -no signs/sx of flare   -C/w steroid  -c/w hydroxychloroquine    I was physically present for the key portions of the evaluation and management (E/M) service provided. Patient seen and examined initially at 11:00PM on 4/21/18.     70 minutes spent on total encounter; more than 50% of the visit was spent counseling and/or coordinating care by the attending physician.  Case d/w NP Kylah Edwards overnight Patient is a 52y old  Male who presents with a chief complaint of Chest pain.      History of Present Illness:   51 yo M w/ hx of SLE, HLD, gout, hypothyroidism presents with exertional CP that was 6/10, constant, not relieved with rest, and radiating to jaw with diaphoresis. The pain did not radiate to back. He came to our ER and was found to have on repeat EKG inferior wall STEMI. He was taken to cath where 100% RCA lesion was intervened on w/ ISABELLA x1. He also was found to have 60% stenosis of LAD. Post cath, he had no complications. Currently, patient on telemetry medicine floor and resting without any pain. He feels no symptoms.     Medications:MEDICATIONS  (STANDING):  aspirin enteric coated 81 milliGRAM(s) Oral daily  atorvastatin 80 milliGRAM(s) Oral at bedtime  gabapentin 600 milliGRAM(s) Oral two times a day  hydroxychloroquine 200 milliGRAM(s) Oral two times a day  levothyroxine 25 MICROGram(s) Oral daily  metoprolol tartrate 12.5 milliGRAM(s) Oral two times a day  pantoprazole    Tablet 40 milliGRAM(s) Oral before breakfast  predniSONE   Tablet 5 milliGRAM(s) Oral daily  ticagrelor 90 milliGRAM(s) Oral two times a day    MEDICATIONS  (PRN):      Allergies: Allergies  No Known Allergies        PMHx: as above     Surgical Hx: no significant surgeries     Family Hx: Father had MI in 40s     Social Hx: Patient reports no prior or current use of tobacco products, illicit drugs, or alcohol consumption.    REVIEW OF SYSTEMS (currently)     CONSTITUTIONAL: No weakness, fevers or chills  ENMT:  No ear pain, No vertigo or throat pain  EYES: No visual changes  or photophobia  NECK: No pain or stiffness  RESPIRATORY: No cough, wheezing, hemoptysis; No shortness of breath  CARDIOVASCULAR: No chest pain or palpitations  GASTROINTESTINAL: No abdominal or epigastric pain. No nausea, vomiting, or hematemesis; No diarrhea or constipation. No melena or hematochezia.  MUSCULOSKELETAL: No joint swelling  or warmth.  GENITOURINARY: No dysuria, frequency or hematuria  NEUROLOGICAL: No numbness or weakness  PSYCHIATRIC: No depression or anhedonia.  ENDOCRINE: No sx hypoglycemic episodes.  SKIN: No itching, burning, rashes, or lesions       PHYSICAL EXAM:  Vital Signs Last 24 Hrs  T(C): 36.5 (04-21-18 @ 21:35)  T(F): 97.7 (04-21-18 @ 21:35), Max: 98.1 (04-21-18 @ 06:00)  HR: 71 (04-21-18 @ 21:35) (68 - 90)  BP: 100/64 (04-21-18 @ 21:35)  BP(mean): 80 (04-21-18 @ 15:00) (62 - 80)  RR: 18 (04-21-18 @ 21:35) (10 - 21)  SpO2: 98% (04-21-18 @ 21:35) (90% - 98%)  Wt(kg): --    04-20 @ 07:01  -  04-21 @ 07:00  --------------------------------------------------------  IN: 180 mL / OUT: 1750 mL / NET: -1570 mL    04-21 @ 07:01  -  04-22 @ 00:43  --------------------------------------------------------  IN: 480 mL / OUT: 650 mL / NET: -170 mL      Constitutional: NAD, awake and alert  EYES: EOMI  ENT:  Normal Hearing, no tonsillar exudates   Neck: Soft and supple , no thyromegaly   Respiratory: Breath sounds are clear bilaterally, No wheezing, rales or rhonchi  Cardiovascular: S1 and S2, regular rate and rhythm, no Murmurs, gallops or rubs, no JVD,    Gastrointestinal: Bowel Sounds present, soft, nontender, nondistended, no guarding, no rebound  Extremities: No cyanosis or clubbing; warm to touch  Vascular: 2+ peripheral pulses lower ex; radial pulses intact bilaterally.   Neurological: No focal deficits, CN II-XII intact bilaterally, sensation to light touch intact in all extremities, gait intact.  Musculoskeletal: 5/5 strength b/l upper and lower extremities; no joint swelling.  Skin: No rashes  Psych: no depression or anhedonia, AAOx3  HEME: no bruises, no nose bleeds      Consultant(s) Notes Reviewed:  [x ] YES  [ ] NO  Care Discussed with Consultants/Other Providers [ x] YES  [ ] NO  Name of Consultant  LABS:                        15.4   5.2   )-----------( 186      ( 21 Apr 2018 06:38 )             46.8     04-21    141  |  104  |  13  ----------------------------<  98  4.2   |  25  |  0.84    Ca    9.7      21 Apr 2018 06:38  Phos  2.9     04-21  Mg     1.9     04-21    TPro  7.2  /  Alb  4.0  /  TBili  0.5  /  DBili  x   /  AST  56<H>  /  ALT  28  /  AlkPhos  92  04-21    PT/INR - ( 20 Apr 2018 13:50 )   PT: 11.3 sec;   INR: 1.04 ratio        PTT - ( 20 Apr 2018 13:50 )  PTT:26.5 sec    I personally reviewed & interpreted the lab findings above; CBC and CMP unremarkable largely; CE downtrended   I personally reviewed & interpreted the radiographic findings; CXR pending   I personally reviewed & interpreted the EKG findings; inferior wall STEMI       51 yo M w/ hx of SLE, HLD, gout, hypothyroidism presents with exertional CP  found to have IWSTEMI    1) ST elevation myocardial infarction involving right coronary artery   -c/w aspirin and ticagrelor   -c/w metoprolol and statin therapy  -staged PCI monday per cards note  -Start ACEi after cath if blood pressure and renal function allows.  -check A1c and lipid panel    2) Chronic systolic congestive heart failure  -Based on recent TTE, mild LV segment dysfunction  -Euvolemic on exam, no need for diuresis now  -c/w statin, beta-blockers.    3) Systemic lupus erythematous, unspecified   -no signs/sx of flare   -C/w steroid  -c/w hydroxychloroquine    I was physically present for the key portions of the evaluation and management (E/M) service provided. Patient seen and examined initially at 11:00PM on 4/21/18.   In addition to time spent above for detail history and exam evaluation, I spent additional 30-minutes reviewing patient's medical chart regarding his clinical course in CCU and prior.   Case d/w NP Kylah barrios

## 2018-04-22 NOTE — PROGRESS NOTE ADULT - SUBJECTIVE AND OBJECTIVE BOX
Patient is a 52y old  Male who presents with a chief complaint of Chest pain, STEMI (20 Apr 2018 15:13)        SUBJECTIVE / OVERNIGHT EVENTS: Patient denies CP or SOB or N/V. Has a toothache.       MEDICATIONS  (STANDING):  aspirin enteric coated 81 milliGRAM(s) Oral daily  atorvastatin 80 milliGRAM(s) Oral at bedtime  gabapentin 600 milliGRAM(s) Oral two times a day  hydroxychloroquine 200 milliGRAM(s) Oral two times a day  levothyroxine 25 MICROGram(s) Oral daily  metoprolol tartrate 12.5 milliGRAM(s) Oral two times a day  pantoprazole    Tablet 40 milliGRAM(s) Oral before breakfast  predniSONE   Tablet 5 milliGRAM(s) Oral daily  ticagrelor 90 milliGRAM(s) Oral two times a day    MEDICATIONS  (PRN):      Vital Signs Last 24 Hrs  T(C): 36.8 (22 Apr 2018 13:37), Max: 36.8 (22 Apr 2018 13:37)  T(F): 98.2 (22 Apr 2018 13:37), Max: 98.2 (22 Apr 2018 13:37)  HR: 102 (22 Apr 2018 18:50) (71 - 109)  BP: 99/70 (22 Apr 2018 18:50) (96/58 - 109/74)  BP(mean): --  RR: 18 (22 Apr 2018 13:37) (18 - 18)  SpO2: 96% (22 Apr 2018 13:37) (96% - 98%)  CAPILLARY BLOOD GLUCOSE        I&O's Summary    21 Apr 2018 07:01  -  22 Apr 2018 07:00  --------------------------------------------------------  IN: 680 mL / OUT: 650 mL / NET: 30 mL    22 Apr 2018 07:01  -  22 Apr 2018 20:57  --------------------------------------------------------  IN: 720 mL / OUT: 0 mL / NET: 720 mL          PHYSICAL EXAM:  GENERAL: NAD, well-developed  HEAD:  Atraumatic, Normocephalic  EYES: EOMI, PERRLA, conjunctiva and sclera clear  NECK: Supple  CHEST/LUNG: Clear to auscultation bilaterally; No wheeze  HEART: Regular rate and rhythm; No murmurs, rubs, or gallops  ABDOMEN: Soft, Nontender, Nondistended; Bowel sounds present  EXTREMITIES: No clubbing, cyanosis, or edema. Right wrist cath site clean and pulses/motor/sensation intact.   PSYCH/Neuro: AAOx3. Non-focal.   SKIN: No rashes or lesions      LABS:                        15.4   5.50  )-----------( 193      ( 22 Apr 2018 08:27 )             44.1     04-22    141  |  103  |  13  ----------------------------<  94  4.2   |  26  |  0.93    Ca    10.2      22 Apr 2018 06:54  Phos  3.3     04-22  Mg     2.0     04-22    TPro  8.0  /  Alb  4.2  /  TBili  0.6  /  DBili  x   /  AST  31  /  ALT  25  /  AlkPhos  107  04-22      CARDIAC MARKERS ( 22 Apr 2018 06:54 )  x     / 0.75 ng/mL / 102 U/L / x     / 7.2 ng/mL  CARDIAC MARKERS ( 21 Apr 2018 06:38 )  x     / 1.15 ng/mL / 346 U/L / x     / 44.1 ng/mL  CARDIAC MARKERS ( 21 Apr 2018 00:29 )  x     / 1.58 ng/mL / 482 U/L / x     / 61.9 ng/mL      Telemetry reviewed: Sinus 's.     RADIOLOGY & ADDITIONAL TESTS:    Imaging Personally Reviewed:  Consultant(s) Notes Reviewed: Cardiology   Care Discussed with Consultants/Other Providers:

## 2018-04-22 NOTE — PROGRESS NOTE ADULT - ASSESSMENT
52 year old male with PMH of SLE, HLD, gout, hypothyroidism, chronic lung disease; presented with exertional CP, found to have IWSTEMI s/p cath with ISABELLA to RCA and 60% stenosis of LAD found.

## 2018-04-23 ENCOUNTER — TRANSCRIPTION ENCOUNTER (OUTPATIENT)
Age: 53
End: 2018-04-23

## 2018-04-23 LAB
ANION GAP SERPL CALC-SCNC: 11 MMOL/L — SIGNIFICANT CHANGE UP (ref 5–17)
BUN SERPL-MCNC: 16 MG/DL — SIGNIFICANT CHANGE UP (ref 7–23)
CALCIUM SERPL-MCNC: 9.6 MG/DL — SIGNIFICANT CHANGE UP (ref 8.4–10.5)
CHLORIDE SERPL-SCNC: 102 MMOL/L — SIGNIFICANT CHANGE UP (ref 96–108)
CO2 SERPL-SCNC: 25 MMOL/L — SIGNIFICANT CHANGE UP (ref 22–31)
CREAT SERPL-MCNC: 0.93 MG/DL — SIGNIFICANT CHANGE UP (ref 0.5–1.3)
GLUCOSE SERPL-MCNC: 96 MG/DL — SIGNIFICANT CHANGE UP (ref 70–99)
HCT VFR BLD CALC: 43.7 % — SIGNIFICANT CHANGE UP (ref 39–50)
HGB BLD-MCNC: 15.4 G/DL — SIGNIFICANT CHANGE UP (ref 13–17)
MCHC RBC-ENTMCNC: 31.2 PG — SIGNIFICANT CHANGE UP (ref 27–34)
MCHC RBC-ENTMCNC: 35.2 GM/DL — SIGNIFICANT CHANGE UP (ref 32–36)
MCV RBC AUTO: 88.5 FL — SIGNIFICANT CHANGE UP (ref 80–100)
PLATELET # BLD AUTO: 194 K/UL — SIGNIFICANT CHANGE UP (ref 150–400)
POTASSIUM SERPL-MCNC: 4.3 MMOL/L — SIGNIFICANT CHANGE UP (ref 3.5–5.3)
POTASSIUM SERPL-SCNC: 4.3 MMOL/L — SIGNIFICANT CHANGE UP (ref 3.5–5.3)
RBC # BLD: 4.94 M/UL — SIGNIFICANT CHANGE UP (ref 4.2–5.8)
RBC # FLD: 13 % — SIGNIFICANT CHANGE UP (ref 10.3–14.5)
SODIUM SERPL-SCNC: 138 MMOL/L — SIGNIFICANT CHANGE UP (ref 135–145)
WBC # BLD: 5.22 K/UL — SIGNIFICANT CHANGE UP (ref 3.8–10.5)
WBC # FLD AUTO: 5.22 K/UL — SIGNIFICANT CHANGE UP (ref 3.8–10.5)

## 2018-04-23 PROCEDURE — 99239 HOSP IP/OBS DSCHRG MGMT >30: CPT

## 2018-04-23 PROCEDURE — 99233 SBSQ HOSP IP/OBS HIGH 50: CPT | Mod: GC

## 2018-04-23 PROCEDURE — 93010 ELECTROCARDIOGRAM REPORT: CPT

## 2018-04-23 PROCEDURE — 92928 PRQ TCAT PLMT NTRAC ST 1 LES: CPT | Mod: LD

## 2018-04-23 PROCEDURE — 93571 IV DOP VEL&/PRESS C FLO 1ST: CPT | Mod: 26,LD

## 2018-04-23 PROCEDURE — 99152 MOD SED SAME PHYS/QHP 5/>YRS: CPT

## 2018-04-23 PROCEDURE — 93454 CORONARY ARTERY ANGIO S&I: CPT | Mod: 26,59

## 2018-04-23 RX ORDER — TICAGRELOR 90 MG/1
1 TABLET ORAL
Qty: 60 | Refills: 0 | OUTPATIENT
Start: 2018-04-23 | End: 2018-05-22

## 2018-04-23 RX ORDER — METOPROLOL TARTRATE 50 MG
1 TABLET ORAL
Qty: 60 | Refills: 0 | OUTPATIENT
Start: 2018-04-23 | End: 2018-05-22

## 2018-04-23 RX ORDER — ATORVASTATIN CALCIUM 80 MG/1
1 TABLET, FILM COATED ORAL
Qty: 30 | Refills: 0 | OUTPATIENT
Start: 2018-04-23 | End: 2018-05-22

## 2018-04-23 RX ORDER — METOPROLOL TARTRATE 50 MG
25 TABLET ORAL
Qty: 0 | Refills: 0 | Status: DISCONTINUED | OUTPATIENT
Start: 2018-04-23 | End: 2018-04-24

## 2018-04-23 RX ORDER — MELOXICAM 15 MG/1
1 TABLET ORAL
Qty: 0 | Refills: 0 | COMMUNITY

## 2018-04-23 RX ORDER — ACETAMINOPHEN 500 MG
1000 TABLET ORAL ONCE
Qty: 0 | Refills: 0 | Status: COMPLETED | OUTPATIENT
Start: 2018-04-23 | End: 2018-04-23

## 2018-04-23 RX ORDER — SIMVASTATIN 20 MG/1
1 TABLET, FILM COATED ORAL
Qty: 0 | Refills: 0 | COMMUNITY

## 2018-04-23 RX ADMIN — ATORVASTATIN CALCIUM 80 MILLIGRAM(S): 80 TABLET, FILM COATED ORAL at 21:40

## 2018-04-23 RX ADMIN — PANTOPRAZOLE SODIUM 40 MILLIGRAM(S): 20 TABLET, DELAYED RELEASE ORAL at 06:01

## 2018-04-23 RX ADMIN — Medication 25 MILLIGRAM(S): at 21:41

## 2018-04-23 RX ADMIN — TICAGRELOR 90 MILLIGRAM(S): 90 TABLET ORAL at 05:58

## 2018-04-23 RX ADMIN — Medication 81 MILLIGRAM(S): at 12:35

## 2018-04-23 RX ADMIN — GABAPENTIN 600 MILLIGRAM(S): 400 CAPSULE ORAL at 21:40

## 2018-04-23 RX ADMIN — TICAGRELOR 90 MILLIGRAM(S): 90 TABLET ORAL at 21:40

## 2018-04-23 RX ADMIN — Medication 25 MICROGRAM(S): at 05:59

## 2018-04-23 RX ADMIN — Medication 400 MILLIGRAM(S): at 21:41

## 2018-04-23 RX ADMIN — GABAPENTIN 600 MILLIGRAM(S): 400 CAPSULE ORAL at 05:58

## 2018-04-23 RX ADMIN — Medication 200 MILLIGRAM(S): at 21:39

## 2018-04-23 RX ADMIN — Medication 12.5 MILLIGRAM(S): at 05:58

## 2018-04-23 RX ADMIN — Medication 5 MILLIGRAM(S): at 05:59

## 2018-04-23 RX ADMIN — Medication 200 MILLIGRAM(S): at 05:59

## 2018-04-23 RX ADMIN — Medication 1000 MILLIGRAM(S): at 22:06

## 2018-04-23 NOTE — PROGRESS NOTE ADULT - SUBJECTIVE AND OBJECTIVE BOX
Patient is a 52y old  Male who presents with a chief complaint of Chest pain, STEMI (20 Apr 2018 15:13)        SUBJECTIVE / OVERNIGHT EVENTS:  C/o some FLORES yesterday after walking short distance. no CP, palpitations.   Afebrile.     MEDICATIONS  (STANDING):  aspirin enteric coated 81 milliGRAM(s) Oral daily  atorvastatin 80 milliGRAM(s) Oral at bedtime  gabapentin 600 milliGRAM(s) Oral two times a day  hydroxychloroquine 200 milliGRAM(s) Oral two times a day  levothyroxine 25 MICROGram(s) Oral daily  metoprolol tartrate 12.5 milliGRAM(s) Oral two times a day  pantoprazole    Tablet 40 milliGRAM(s) Oral before breakfast  predniSONE   Tablet 5 milliGRAM(s) Oral daily  ticagrelor 90 milliGRAM(s) Oral two times a day    MEDICATIONS  (PRN):  acetaminophen   Tablet. 650 milliGRAM(s) Oral every 6 hours PRN Mild Pain (1 - 3)      Vital Signs Last 24 Hrs  T(C): 36.6 (23 Apr 2018 11:12), Max: 36.8 (22 Apr 2018 13:37)  T(F): 97.9 (23 Apr 2018 11:12), Max: 98.2 (22 Apr 2018 13:37)  HR: 88 (23 Apr 2018 11:12) (80 - 109)  BP: 107/74 (23 Apr 2018 11:12) (98/68 - 109/74)  BP(mean): --  RR: 18 (23 Apr 2018 11:12) (18 - 18)  SpO2: 96% (23 Apr 2018 11:12) (94% - 96%)  CAPILLARY BLOOD GLUCOSE        I&O's Summary    22 Apr 2018 07:01  -  23 Apr 2018 07:00  --------------------------------------------------------  IN: 1200 mL / OUT: 0 mL / NET: 1200 mL    23 Apr 2018 07:01  -  23 Apr 2018 13:18  --------------------------------------------------------  IN: 360 mL / OUT: 0 mL / NET: 360 mL          PHYSICAL EXAM  GENERAL: NAD, well-developed  HEAD:  Atraumatic, Normocephalic  EYES: EOMI, PERRLA, conjunctiva and sclera clear  NECK: Supple, No JVD  CHEST/LUNG: Clear to auscultation bilaterally; No wheeze  HEART: Regular rate and rhythm; No murmurs, rubs, or gallops  ABDOMEN: Soft, Nontender, Nondistended; Bowel sounds present  EXTREMITIES:  2+ Peripheral Pulses, No clubbing, cyanosis, or edema  PSYCH: AAOx3  SKIN: No rashes or lesions    LABS:                        15.4   5.22  )-----------( 194      ( 23 Apr 2018 07:30 )             43.7     04-23    138  |  102  |  16  ----------------------------<  96  4.3   |  25  |  0.93    Ca    9.6      23 Apr 2018 06:28  Phos  3.3     04-22  Mg     2.0     04-22    TPro  8.0  /  Alb  4.2  /  TBili  0.6  /  DBili  x   /  AST  31  /  ALT  25  /  AlkPhos  107  04-22      CARDIAC MARKERS ( 22 Apr 2018 06:54 )  x     / 0.75 ng/mL / 102 U/L / x     / 7.2 ng/mL            RADIOLOGY & ADDITIONAL TESTS:    Imaging Personally Reviewed:  Consultant(s) Notes Reviewed:  card  Care Discussed with Consultants/Other Providers:

## 2018-04-23 NOTE — DISCHARGE NOTE ADULT - SECONDARY DIAGNOSIS.
Systemic lupus erythematosus, unspecified SLE type, unspecified organ involvement status Toothache Hyperlipidemia, unspecified hyperlipidemia type Hypothyroidism, unspecified type

## 2018-04-23 NOTE — DISCHARGE NOTE ADULT - MEDICATION SUMMARY - MEDICATIONS TO TAKE
I will START or STAY ON the medications listed below when I get home from the hospital:    predniSONE 5 mg oral tablet  -- 1 tab(s) by mouth once a day  -- Indication: For Systemic lupus erythematosus, unspecified SLE type, unspecified organ involvement status    aspirin 81 mg oral tablet  -- 1 tab(s) by mouth once a day  -- Indication: For ST elevation myocardial infarction (STEMI) involving other coronary artery of inferior wall    gabapentin 600 mg oral tablet  -- 1 tab(s) by mouth 2 times a day  -- Indication: For Pain    hydroxychloroquine 200 mg oral tablet  -- 1 tab(s) by mouth 2 times a day  -- Indication: For Systemic lupus erythematosus, unspecified SLE type, unspecified organ involvement status    omeprazole 40 mg oral delayed release capsule  -- 1 cap(s) by mouth once a day  -- Indication: For reflux    levothyroxine 25 mcg (0.025 mg) oral tablet  -- 1 tab(s) by mouth once a day  -- Indication: For Hypothyroidism, unspecified type I will START or STAY ON the medications listed below when I get home from the hospital:    predniSONE 5 mg oral tablet  -- 1 tab(s) by mouth once a day  -- Indication: For Systemic lupus erythematosus, unspecified SLE type, unspecified organ involvement status    aspirin 81 mg oral tablet  -- 1 tab(s) by mouth once a day  -- Indication: For ST elevation myocardial infarction (STEMI) involving other coronary artery of inferior wall    gabapentin 600 mg oral tablet  -- 1 tab(s) by mouth 2 times a day  -- Indication: For Pain    atorvastatin 80 mg oral tablet  -- 1 tab(s) by mouth once a day (at bedtime)  -- Indication: For ST elevation myocardial infarction (STEMI) involving other coronary artery of inferior wall    hydroxychloroquine 200 mg oral tablet  -- 1 tab(s) by mouth 2 times a day  -- Indication: For Systemic lupus erythematosus, unspecified SLE type, unspecified organ involvement status    clopidogrel 75 mg oral tablet  -- 1 tab(s) by mouth once a day  -- Indication: For ST elevation myocardial infarction (STEMI) involving other coronary artery of inferior wall    metoprolol succinate 50 mg oral tablet, extended release  -- 1 tab(s) by mouth once a day   -- Indication: For ST elevation myocardial infarction (STEMI) involving other coronary artery of inferior wall    omeprazole 40 mg oral delayed release capsule  -- 1 cap(s) by mouth once a day  -- Indication: For reflux    levothyroxine 25 mcg (0.025 mg) oral tablet  -- 1 tab(s) by mouth once a day  -- Indication: For Hypothyroidism, unspecified type

## 2018-04-23 NOTE — DISCHARGE NOTE ADULT - PLAN OF CARE
Prevention, improved hear function Cont with all the meds as prescribed. Follow up with cardiology as outpt. Cont with steroid and rest of treatment Follow up with dental as outpt Cont with statin Cont with synthroid Cont with steroid   Please do not take Meloxicam while on asa and plavix  Tylenol for pain.

## 2018-04-23 NOTE — PROGRESS NOTE ADULT - SUBJECTIVE AND OBJECTIVE BOX
Patient underwent a PCI procedure and is being admitted as they are at increased risk for major adverse cardiac and vascular events if discharged due to the following high risk characteristics:    STEMI   -

## 2018-04-23 NOTE — PROGRESS NOTE ADULT - NSHPATTENDINGPLANDISCUSS_GEN_ALL_CORE
cardiology fellow, Dr. ANGELA Raphael; patient seen and examined.  Hx., exam and labs as above.  I agree with the assessment and recommendations.
TASNEEM Pacheco

## 2018-04-23 NOTE — DISCHARGE NOTE ADULT - CARE PROVIDERS DIRECT ADDRESSES
,DirectAddress_Unknown ,DirectAddress_Unknown,[email or web address]

## 2018-04-23 NOTE — PROGRESS NOTE ADULT - SUBJECTIVE AND OBJECTIVE BOX
Patient seen and examined at bedside on 4 Eric    Overnight Events: No events overnight    REVIEW OF SYSTEMS:  CONSTITUTIONAL: No weakness, fevers or chills  EYES/ENT: No visual changes;  No dysphagia  NECK: No pain or stiffness  RESPIRATORY: No cough, wheezing, hemoptysis; No shortness of breath  CARDIOVASCULAR: No chest pain or palpitations; No lower extremity edema  GASTROINTESTINAL: No abdominal or epigastric pain. No nausea, vomiting, or hematemesis; No diarrhea or constipation. No melena or hematochezia.  BACK: No back pain  GENITOURINARY: No dysuria, frequency or hematuria  NEUROLOGICAL: No numbness or weakness  SKIN: No itching, burning, rashes, or lesions   All other review of systems is negative unless indicated above.            Current Meds:  acetaminophen   Tablet. 650 milliGRAM(s) Oral every 6 hours PRN  aspirin enteric coated 81 milliGRAM(s) Oral daily  atorvastatin 80 milliGRAM(s) Oral at bedtime  gabapentin 600 milliGRAM(s) Oral two times a day  hydroxychloroquine 200 milliGRAM(s) Oral two times a day  levothyroxine 25 MICROGram(s) Oral daily  metoprolol tartrate 12.5 milliGRAM(s) Oral two times a day  pantoprazole    Tablet 40 milliGRAM(s) Oral before breakfast  predniSONE   Tablet 5 milliGRAM(s) Oral daily  ticagrelor 90 milliGRAM(s) Oral two times a day    Vitals:  T(F): 98.1 (04-23), Max: 98.2 (04-22)  HR: 86 (04-23) (80 - 109)  BP: 98/68 (04-23) (98/68 - 109/74)  RR: 18 (04-23)  SpO2: 96% on RA    I&O's Summary    21 Apr 2018 07:01  -  22 Apr 2018 07:00  --------------------------------------------------------  IN: 680 mL / OUT: 650 mL / NET: 30 mL    22 Apr 2018 07:01  -  23 Apr 2018 05:59  --------------------------------------------------------  IN: 720 mL / OUT: 0 mL / NET: 720 mL    Physical Exam:  Appearance: No acute distress; well appearing  Eyes: PERRL, EOMI, pink conjunctiva  HENT: Normal oral mucosa  Cardiovascular: RRR, S1, S2, no murmurs, rubs, or gallops; no edema; no JVD  Respiratory: Clear to auscultation bilaterally  Gastrointestinal: soft, non-tender, non-distended with normal bowel sounds  Musculoskeletal: No clubbing; no joint deformity   Neurologic: Non-focal  Lymphatic: No lymphadenopathy  Psychiatry: AAOx3, mood & affect appropriate  Skin: No rashes, ecchymoses, or cyanosis  Vasc: R radial access site C/D/I, no hematoma, no ecchymosis    AM LABS PENDING    CARDIAC MARKERS ( 22 Apr 2018 06:54 )  x     / 0.75 ng/mL / 102 U/L / x     / 7.2 ng/mL  CARDIAC MARKERS ( 21 Apr 2018 06:38 )  x     / 1.15 ng/mL / 346 U/L / x     / 44.1 ng/mL    Hemoglobin A1C, Whole Blood: 5.8 % (04-22 @ 08:27) - pre-DM    No TSH sent    Echo: < from: Transthoracic Echocardiogram (04.21.18 @ 11:30) >  Dimensions:    Normal Values:  LA:     3.6    2.0 - 4.0 cm  Ao:     3.2    2.0 - 3.8 cm  SEPTUM: 1.0    0.6 - 1.2 cm  PWT:    0.8    0.6 - 1.1 cm  LVIDd:  5.1    3.0 - 5.6 cm  LVIDs:  3.2    1.8 - 4.0 cm  Derived variables:  LVMI: 75 g/m2  RWT: 0.31  Fractional short: 37 %  EF (Visual Estimate): 45-50 %  Doppler Peak Velocity (m/sec): AoV=1.4  ------------------------------------------------------------------------  Observations: Mitral Valve: Normal mitral valve. Minimal mitral regurgitation. Aortic Valve/Aorta: Calcified trileaflet aortic valve with normal opening. Peak transaortic valve gradient equals 8 mm Hg, aortic valve velocity time integral equals 5 cm. No aortic valve regurgitation seen. Aortic Root: 3.2 cm. Left Atrium: Normal left atrium.  LA volume index = 20 cc/m2. Hypermobile interatrial septum. Left Ventricle: Mild segmental left ventricular systolic dysfunction. The basal to mid inferoseptal and basal inferior segments are hypokinetic. Normal left ventricular internal dimensions and wall thicknesses. Normal diastolic function Right Heart: Normal right atrium. Normal right ventricular size and function. Normal tricuspid valve. Mild tricuspid regurgitation. Normal pulmonic valve. Pericardium/Pleura: Normal pericardium with no pericardial effusion. Hemodynamic: Estimated right atrial pressure is 8 mm Hg. Inadequate tricuspid regurgitation Doppler envelope precludes estimation of RVSP.  < end of copied text >    Cath: Official cath report from Friday pending, prelim report in paper chart w/ 100% mRCA lesion and 60% mLAD lesion    Interpretation of Telemetry: sinus 70s - 100s w/ periods of ST to the 130s Patient seen and examined at bedside on 4 Eric    Overnight Events: No events overnight    REVIEW OF SYSTEMS:  CONSTITUTIONAL: No weakness, fevers or chills  EYES/ENT: No visual changes;  No dysphagia  NECK: No pain or stiffness  RESPIRATORY: No cough, wheezing, hemoptysis; No shortness of breath  CARDIOVASCULAR: No chest pain or palpitations; No lower extremity edema  GASTROINTESTINAL: No abdominal or epigastric pain. No nausea, vomiting, or hematemesis; No diarrhea or constipation. No melena or hematochezia.  BACK: No back pain  GENITOURINARY: No dysuria, frequency or hematuria  NEUROLOGICAL: No numbness or weakness  SKIN: No itching, burning, rashes, or lesions   All other review of systems is negative unless indicated above.            Current Meds:  acetaminophen   Tablet. 650 milliGRAM(s) Oral every 6 hours PRN  aspirin enteric coated 81 milliGRAM(s) Oral daily  atorvastatin 80 milliGRAM(s) Oral at bedtime  gabapentin 600 milliGRAM(s) Oral two times a day  hydroxychloroquine 200 milliGRAM(s) Oral two times a day  levothyroxine 25 MICROGram(s) Oral daily  metoprolol tartrate 12.5 milliGRAM(s) Oral two times a day  pantoprazole    Tablet 40 milliGRAM(s) Oral before breakfast  predniSONE   Tablet 5 milliGRAM(s) Oral daily  ticagrelor 90 milliGRAM(s) Oral two times a day    Vitals:  T(F): 98.1 (04-23), Max: 98.2 (04-22)  HR: 86 (04-23) (80 - 109)  BP: 98/68 (04-23) (98/68 - 109/74)  RR: 18 (04-23)  SpO2: 96% on RA    Physical Exam:  Appearance: No acute distress; well appearing  Eyes: PERRL, EOMI, pink conjunctiva  HENT: Normal oral mucosa  Cardiovascular: RRR, S1, S2, no murmurs, rubs, or gallops; no edema; no JVD  Respiratory: Clear to auscultation bilaterally  Gastrointestinal: soft, non-tender, non-distended with normal bowel sounds  Musculoskeletal: No clubbing; no joint deformity   Neurologic: Non-focal  Lymphatic: No lymphadenopathy  Psychiatry: AAOx3, mood & affect appropriate  Skin: No rashes, ecchymoses, or cyanosis  Vasc: R radial access site C/D/I, no hematoma, no ecchymosis      Interpretation of Telemetry: sinus 70s - 100s w/ periods of ST to the 130s      AM LABS PENDING    CARDIAC MARKERS ( 22 Apr 2018 06:54 )   0.75 ng/mL / 102 U/L / x     / 7.2 ng/mL  CARDIAC MARKERS ( 21 Apr 2018 06:38 )   1.15 ng/mL / 346 U/L / x     / 44.1 ng/mL    Hemoglobin A1C, Whole Blood: 5.8 % (04-22 @ 08:27) - pre-DM    No TSH sent    Echo: < from: Transthoracic Echocardiogram (04.21.18 @ 11:30) >  Dimensions:    Normal Values:  LA:     3.6    2.0 - 4.0 cm  Ao:     3.2    2.0 - 3.8 cm  SEPTUM: 1.0    0.6 - 1.2 cm  PWT:    0.8    0.6 - 1.1 cm  LVIDd:  5.1    3.0 - 5.6 cm  LVIDs:  3.2    1.8 - 4.0 cm  Derived variables:  LVMI: 75 g/m2  RWT: 0.31  Fractional short: 37 %  EF (Visual Estimate): 45-50 %  Doppler Peak Velocity (m/sec): AoV=1.4  ------------------------------------------------------------------------  Observations: Mitral Valve: Normal mitral valve. Minimal mitral regurgitation. Aortic Valve/Aorta: Calcified trileaflet aortic valve with normal opening. Peak transaortic valve gradient equals 8 mm Hg, aortic valve velocity time integral equals 5 cm. No aortic valve regurgitation seen. Aortic Root: 3.2 cm. Left Atrium: Normal left atrium.  LA volume index = 20 cc/m2. Hypermobile interatrial septum. Left Ventricle: Mild segmental left ventricular systolic dysfunction. The basal to mid inferoseptal and basal inferior segments are hypokinetic. Normal left ventricular internal dimensions and wall thicknesses. Normal diastolic function Right Heart: Normal right atrium. Normal right ventricular size and function. Normal tricuspid valve. Mild tricuspid regurgitation. Normal pulmonic valve. Pericardium/Pleura: Normal pericardium with no pericardial effusion. Hemodynamic: Estimated right atrial pressure is 8 mm Hg. Inadequate tricuspid regurgitation Doppler envelope precludes estimation of RVSP.  < end of copied text       Cath: Official cath report from Friday pending, prelim report in paper chart w/ 100% mRCA lesion and 60% mLAD lesion

## 2018-04-23 NOTE — DISCHARGE NOTE ADULT - HOSPITAL COURSE
51 yo m with h/o SLE on steroid, HLD, gout presented with CP, new ST elevations in II, III, aVF and TWI in aVL, ST depressions in lead I. He was and brought emergently to cath lab for inferior wall STEMI where he was found to have 100% mRCA occlusion. He was revascularized and sent to CCU for close monitoring post-MI. Patient went for subsequent staged PCI to LAD on 4/23 without complications.

## 2018-04-23 NOTE — DISCHARGE NOTE ADULT - CARE PLAN
Principal Discharge DX:	STEMI involving oth coronary artery of inferior wall  Goal:	Prevention, improved hear function  Assessment and plan of treatment:	Cont with all the meds as prescribed. Follow up with cardiology as outpt.  Secondary Diagnosis:	Systemic lupus erythematosus, unspecified SLE type, unspecified organ involvement status  Assessment and plan of treatment:	Cont with steroid and rest of treatment  Secondary Diagnosis:	Toothache  Assessment and plan of treatment:	Follow up with dental as outpt  Secondary Diagnosis:	Hyperlipidemia, unspecified hyperlipidemia type  Assessment and plan of treatment:	Cont with statin  Secondary Diagnosis:	Hypothyroidism, unspecified type  Assessment and plan of treatment:	Cont with synthroid Principal Discharge DX:	STEMI involving oth coronary artery of inferior wall  Goal:	Prevention, improved hear function  Assessment and plan of treatment:	Cont with all the meds as prescribed. Follow up with cardiology as outpt.  Secondary Diagnosis:	Systemic lupus erythematosus, unspecified SLE type, unspecified organ involvement status  Assessment and plan of treatment:	Cont with steroid   Please do not take Meloxicam while on asa and plavix  Tylenol for pain.  Secondary Diagnosis:	Toothache  Assessment and plan of treatment:	Follow up with dental as outpt  Secondary Diagnosis:	Hyperlipidemia, unspecified hyperlipidemia type  Assessment and plan of treatment:	Cont with statin  Secondary Diagnosis:	Hypothyroidism, unspecified type  Assessment and plan of treatment:	Cont with synthroid

## 2018-04-23 NOTE — DISCHARGE NOTE ADULT - PATIENT PORTAL LINK FT
You can access the [facility] Patient Portal, offered by [facility], by registering with the following website: [email or web address]

## 2018-04-23 NOTE — PROGRESS NOTE ADULT - ASSESSMENT
52M w/ PMHx of SLE, Hypothyroid and HLD who was admitted on 4/20 w/ IWSTEMI (s/p ISABELLA to 100% mRCA lesion, RRA). The patient was transferred out of the CCU over the weekend and is pending a stages PCI to the LAD toda.y TTE done showing LVEF:45-50%.     Plan:    IWSTEMI:  -c/w ASA 81 daily and Brilinta 90 bid (ensure that insurance covers this medication)  -c/w Lipitor 80 tonight  -Increase Lopressor from 12.5 bid to 25 bid  -Check TSH  -Will consider ACEI after stagged PCI to LAD today  -Consent for [facility] signed and in the front of the chart    General Cardiology will follow. The patient's outpatient Cardiologist was Dr. Frederick Stanford. The patient states his office is too far from his home - pt can f/u with Dr. Moses as an outpatient [phone]    Ijeoma (p): [phone] 52M w/ PMHx of SLE, Hypothyroid and HLD who was admitted on 4/20 w/ IWSTEMI (s/p ISABELLA to 100% mRCA lesion, RRA). The patient was transferred out of the CCU over the weekend and is pending a staged PCI to the LAD toda.y TTE done showing LVEF:45-50%.     Plan:    1.  IWSTEMI, s/p RCA PCI/ISABELLA  -c/w ASA 81 daily and Brilinta 90 bid (ensure that insurance covers this medication)  -c/w Lipitor 80 tonight  -Increase Lopressor from 12.5 bid to 25 bid  -Check TSH    2.  LAD lesion  -for PCI today  -Will consider ACEI after stagged PCI to LAD today  -Consent for [facility] signed and in the front of the chart    3.  HLD  - continue statin    General Cardiology will follow. The patient's outpatient Cardiologist was Dr. Frederick Stanford. The patient states his office is too far from his home - pt can f/u with Dr. Moses as an outpatient [phone]      ANGELA Raphael M.D.  Cardiology fellow  (p): [phone]    Glenn Rowland M.D.  Cardiology Consult Service  [phone] or [phone]

## 2018-04-23 NOTE — DISCHARGE NOTE ADULT - CARE PROVIDER_API CALL
Frederick Stanford (MD), Internal Medicine  [address] [place] [address]  Phone: [phone]  Fax: [phone] Frederick Stanford (MD), Internal Medicine  [address] [facility] [address]  [place] [address]  Phone: [phone]  Fax: [phone]    Mary Moses), Cardiovascular Disease; Interventional Cardiology  [address] [place] [address]  Phone: [phone]  Fax: [phone]

## 2018-04-24 VITALS
SYSTOLIC BLOOD PRESSURE: 106 MMHG | DIASTOLIC BLOOD PRESSURE: 71 MMHG | HEART RATE: 94 BPM | OXYGEN SATURATION: 95 % | RESPIRATION RATE: 18 BRPM | TEMPERATURE: 98 F

## 2018-04-24 LAB — TSH SERPL-MCNC: 5.19 UIU/ML — HIGH (ref 0.27–4.2)

## 2018-04-24 PROCEDURE — C1725: CPT

## 2018-04-24 PROCEDURE — 93458 L HRT ARTERY/VENTRICLE ANGIO: CPT | Mod: 59

## 2018-04-24 PROCEDURE — 93306 TTE W/DOPPLER COMPLETE: CPT

## 2018-04-24 PROCEDURE — 83605 ASSAY OF LACTIC ACID: CPT

## 2018-04-24 PROCEDURE — 83036 HEMOGLOBIN GLYCOSYLATED A1C: CPT

## 2018-04-24 PROCEDURE — 86901 BLOOD TYPING SEROLOGIC RH(D): CPT

## 2018-04-24 PROCEDURE — C1887: CPT

## 2018-04-24 PROCEDURE — 85730 THROMBOPLASTIN TIME PARTIAL: CPT

## 2018-04-24 PROCEDURE — 85610 PROTHROMBIN TIME: CPT

## 2018-04-24 PROCEDURE — 86850 RBC ANTIBODY SCREEN: CPT

## 2018-04-24 PROCEDURE — 84443 ASSAY THYROID STIM HORMONE: CPT

## 2018-04-24 PROCEDURE — 80053 COMPREHEN METABOLIC PANEL: CPT

## 2018-04-24 PROCEDURE — 99285 EMERGENCY DEPT VISIT HI MDM: CPT | Mod: 25

## 2018-04-24 PROCEDURE — 84295 ASSAY OF SERUM SODIUM: CPT

## 2018-04-24 PROCEDURE — C1757: CPT

## 2018-04-24 PROCEDURE — 99152 MOD SED SAME PHYS/QHP 5/>YRS: CPT

## 2018-04-24 PROCEDURE — 83880 ASSAY OF NATRIURETIC PEPTIDE: CPT

## 2018-04-24 PROCEDURE — 80048 BASIC METABOLIC PNL TOTAL CA: CPT

## 2018-04-24 PROCEDURE — C9600: CPT | Mod: LD

## 2018-04-24 PROCEDURE — 93454 CORONARY ARTERY ANGIO S&I: CPT | Mod: 59

## 2018-04-24 PROCEDURE — 82553 CREATINE MB FRACTION: CPT

## 2018-04-24 PROCEDURE — C1874: CPT

## 2018-04-24 PROCEDURE — 84484 ASSAY OF TROPONIN QUANT: CPT

## 2018-04-24 PROCEDURE — 86900 BLOOD TYPING SEROLOGIC ABO: CPT

## 2018-04-24 PROCEDURE — 93571 IV DOP VEL&/PRESS C FLO 1ST: CPT

## 2018-04-24 PROCEDURE — 83735 ASSAY OF MAGNESIUM: CPT

## 2018-04-24 PROCEDURE — C1894: CPT

## 2018-04-24 PROCEDURE — C1769: CPT

## 2018-04-24 PROCEDURE — 80061 LIPID PANEL: CPT

## 2018-04-24 PROCEDURE — 82435 ASSAY OF BLOOD CHLORIDE: CPT

## 2018-04-24 PROCEDURE — 84100 ASSAY OF PHOSPHORUS: CPT

## 2018-04-24 PROCEDURE — C9606: CPT | Mod: RC

## 2018-04-24 PROCEDURE — 99239 HOSP IP/OBS DSCHRG MGMT >30: CPT

## 2018-04-24 PROCEDURE — 82803 BLOOD GASES ANY COMBINATION: CPT

## 2018-04-24 PROCEDURE — 96374 THER/PROPH/DIAG INJ IV PUSH: CPT

## 2018-04-24 PROCEDURE — 99232 SBSQ HOSP IP/OBS MODERATE 35: CPT | Mod: GC

## 2018-04-24 PROCEDURE — 84132 ASSAY OF SERUM POTASSIUM: CPT

## 2018-04-24 PROCEDURE — 99153 MOD SED SAME PHYS/QHP EA: CPT

## 2018-04-24 PROCEDURE — 82947 ASSAY GLUCOSE BLOOD QUANT: CPT

## 2018-04-24 PROCEDURE — 85014 HEMATOCRIT: CPT

## 2018-04-24 PROCEDURE — 82550 ASSAY OF CK (CPK): CPT

## 2018-04-24 PROCEDURE — 85027 COMPLETE CBC AUTOMATED: CPT

## 2018-04-24 PROCEDURE — 82330 ASSAY OF CALCIUM: CPT

## 2018-04-24 PROCEDURE — 93005 ELECTROCARDIOGRAM TRACING: CPT

## 2018-04-24 RX ORDER — ATORVASTATIN CALCIUM 80 MG/1
1 TABLET, FILM COATED ORAL
Qty: 30 | Refills: 3
Start: 2018-04-24 | End: 2018-08-21

## 2018-04-24 RX ORDER — METOPROLOL TARTRATE 50 MG
1 TABLET ORAL
Qty: 30 | Refills: 0
Start: 2018-04-24 | End: 2018-05-23

## 2018-04-24 RX ORDER — ASPIRIN/CALCIUM CARB/MAGNESIUM 324 MG
1 TABLET ORAL
Qty: 30 | Refills: 3
Start: 2018-04-24 | End: 2018-08-21

## 2018-04-24 RX ORDER — ASPIRIN/CALCIUM CARB/MAGNESIUM 324 MG
1 TABLET ORAL
Qty: 0 | Refills: 0 | COMMUNITY

## 2018-04-24 RX ORDER — CLOPIDOGREL BISULFATE 75 MG/1
1 TABLET, FILM COATED ORAL
Qty: 30 | Refills: 0 | OUTPATIENT
Start: 2018-04-24 | End: 2018-05-23

## 2018-04-24 RX ORDER — CLOPIDOGREL BISULFATE 75 MG/1
75 TABLET, FILM COATED ORAL DAILY
Qty: 0 | Refills: 0 | Status: DISCONTINUED | OUTPATIENT
Start: 2018-04-24 | End: 2018-04-24

## 2018-04-24 RX ORDER — CLOPIDOGREL BISULFATE 75 MG/1
1 TABLET, FILM COATED ORAL
Qty: 30 | Refills: 3
Start: 2018-04-24 | End: 2018-08-21

## 2018-04-24 RX ADMIN — Medication 25 MICROGRAM(S): at 05:13

## 2018-04-24 RX ADMIN — PANTOPRAZOLE SODIUM 40 MILLIGRAM(S): 20 TABLET, DELAYED RELEASE ORAL at 08:32

## 2018-04-24 RX ADMIN — CLOPIDOGREL BISULFATE 75 MILLIGRAM(S): 75 TABLET, FILM COATED ORAL at 12:12

## 2018-04-24 RX ADMIN — Medication 81 MILLIGRAM(S): at 12:13

## 2018-04-24 RX ADMIN — TICAGRELOR 90 MILLIGRAM(S): 90 TABLET ORAL at 05:13

## 2018-04-24 RX ADMIN — Medication 200 MILLIGRAM(S): at 05:14

## 2018-04-24 RX ADMIN — Medication 25 MILLIGRAM(S): at 05:13

## 2018-04-24 RX ADMIN — Medication 5 MILLIGRAM(S): at 05:13

## 2018-04-24 RX ADMIN — GABAPENTIN 600 MILLIGRAM(S): 400 CAPSULE ORAL at 05:13

## 2018-04-24 NOTE — PROGRESS NOTE ADULT - PROBLEM SELECTOR PLAN 1
-S/p cardiac cath with ISABELLA to RCA and LAD.   -COnt with ASA 81mg daily and Plavix (Brilinta not covered).   -Hold off ACE-I in setting of lower BP  -C/w Toprol XL 50mg daily and atorvastatin 80mg at bedtime.   -C/w DASH diet.   -TTE showed mild segmental LV systolic dysfunction with EF of 45-50%.
-S/p cardiac cath with ISABELLA to RCA and found to have 60% stenosis of LAD. Planned for LAD staging today.  -On ASA 81mg daily and Brilinta 90mg twice daily.   - will need to change to plavix (no covered by insurance)  -ACEi being held prior to cardiac cath for nephro-protection.   -C/w metoprolol 25mg twice daily and atorvastatin 80mg at bedtime.   -C/w DASH diet.   -TTE showed mild segmental LV systolic dysfunction with EF of 45-50%.  -C/w telemetry.  -Troponins have downtrended.
S/P m RCA stent  Pt remains hemodynamically stable and CP free  CE peaked  Cont. ASA, Brilinta, Lipitor   Start ACEI and BB as pt tolerates  Ambulates as tolerates  DASH diet  LAD staging Monday  TTE
-S/p cardiac cath with ISABELLA to RCA and found to have 60% stenosis of LAD. Planned for LAD staging tomorrow.   -C/w ASA 81mg daily and Brilinta 90mg twice daily.   -ACEi being held prior to cardiac cath for nephro-protection.   -C/w metoprolol 12.5mg twice daily and atorvastatin 80mg at bedtime.   -C/w DASH diet.   -TTE showed mild segmental LV systolic dysfunction with EF of 45-50%.  -C/w telemetry.  -Troponins have downtrended.

## 2018-04-24 NOTE — PROGRESS NOTE ADULT - PROBLEM SELECTOR PLAN 2
-C/w hydroxychloroquine 200mg twice daily.   -C/w prednisone 5mg daily.   -C/w gabapentin 600mg twice daily.
-C/w hydroxychloroquine 200mg twice daily.   -C/w prednisone 5mg daily.   -C/w gabapentin 600mg twice daily.  Hold off taking Meloxicam
Cont. Prednisone
-C/w hydroxychloroquine 200mg twice daily.   -C/w prednisone 5mg daily.   -C/w gabapentin 600mg twice daily.

## 2018-04-24 NOTE — PROGRESS NOTE ADULT - PROBLEM SELECTOR PLAN 6
-Tylenol as needed for pain.   -Outpatient dental follow up. Discussed with patient that he needs to stay on DAPT, so to discuss with his cardiologist/PMD prior to any dental procedures.

## 2018-04-24 NOTE — PROGRESS NOTE ADULT - ASSESSMENT
52M w/ PMHx of SLE, Hypothyroid and HLD who was admitted on 4/20 w/ IWSTEMI (s/p ISABELLA to 100% mRCA lesion, RRA). The patient was transferred out of the CCU over the weekend and had a LHC on 4/23 by Dr. Moses - IFR of the LAD lesion was abnormal - s/p PCI to LAD (R radial access). TTE done showing LVEF:45-50%.     Plan:    1.  IWSTEMI, s/p RCA ISABELLA w/ staged ISABELLA to LAD on 4/23  -c/w ASA 81 daily  -c/w Brilinta 90 bid - if patient's insurance does not cover this medication change to Plavix 75 daily (do not need to load Plavix)  -c/w Lipitor 80  -Change Lopressor 25 bid to Toprol XL 50 on discharge  -Would not start ACEI at this time (EF:45-50% and SBPs ranging 90s - 110s in a patient with reported orthostatic hypotension)  -Check TSH (ordered to be drawn with am labs this morning)    2.  HLD  - c/w statin    General Cardiology will follow. The patient's outpatient Cardiologist was Dr. Frederick Stanford. The patient states his office is too far from his home - pt can f/u with Dr. Moses as an outpatient [phone]. No further cardiac w/u as inpatient.    ANGELA Raphael MD  Cardiology Fellow  (p): [phone] 52M w/ PMHx of SLE, Hypothyroid and HLD.  Admitted on 4/20 w/ IWSTEMI (s/p ISABELLA to 100% mRCA lesion, RRA).   LHC on 4/23 by Dr. Bowers - IFR of the LAD lesion was abnormal - s/p PCI to LAD (R radial access).   TTE done showing LVEF:45-50%.     REC:    1.  IWSTEMI, s/p RCA ISABELLA w/ staged ISABELLA to LAD on 4/23  -c/w ASA 81 daily  -c/w Brilinta 90 bid - if patient's insurance does not cover this medication change to Plavix 75 daily (do not need to load Plavix)  -c/w Lipitor 80  -Change Lopressor 25 bid to Toprol XL 50 on discharge  -Would not start ACEI at this time (EF:45-50% and SBPs ranging 90s - 110s in a patient with reported orthostatic hypotension)  -Check TSH (ordered to be drawn with am labs this morning)    2.  HLD  - c/w statin    General Cardiology will follow. The patient's outpatient Cardiologist was Dr. Frederick Stanford. The patient states his office is too far from his home - pt can f/u with Dr. Bowers as an outpatient [phone]. No further cardiac w/u as inpatient.    ANGELA Raphael MD  Cardiology Fellow  (p): [phone]    Glenn Rowland M.D.  Cardiology Consult Service  [phone] or [phone]

## 2018-04-24 NOTE — PROGRESS NOTE ADULT - SUBJECTIVE AND OBJECTIVE BOX
Patient seen and examined at bedside.    Overnight Events:     REVIEW OF SYSTEMS:    CONSTITUTIONAL: No weakness, fevers or chills  EYES/ENT: No visual changes;  No dysphagia  NECK: No pain or stiffness  RESPIRATORY: No cough, wheezing, hemoptysis; No shortness of breath  CARDIOVASCULAR: No chest pain or palpitations; No lower extremity edema  GASTROINTESTINAL: No abdominal or epigastric pain. No nausea, vomiting, or hematemesis; No diarrhea or constipation. No melena or hematochezia.  BACK: No back pain  GENITOURINARY: No dysuria, frequency or hematuria  NEUROLOGICAL: No numbness or weakness  SKIN: No itching, burning, rashes, or lesions   All other review of systems is negative unless indicated above.            Current Meds:  acetaminophen   Tablet. 650 milliGRAM(s) Oral every 6 hours PRN  aspirin enteric coated 81 milliGRAM(s) Oral daily  atorvastatin 80 milliGRAM(s) Oral at bedtime  gabapentin 600 milliGRAM(s) Oral two times a day  hydroxychloroquine 200 milliGRAM(s) Oral two times a day  levothyroxine 25 MICROGram(s) Oral daily  metoprolol tartrate 25 milliGRAM(s) Oral two times a day  pantoprazole    Tablet 40 milliGRAM(s) Oral before breakfast  predniSONE   Tablet 5 milliGRAM(s) Oral daily  ticagrelor 90 milliGRAM(s) Oral two times a day      Vitals:  T(F): 98 (04-24), Max: 98.1 (04-23)  HR: 71 (04-24) (63 - 88)  BP: 98/64 (04-24) (98/64 - 114/70)  RR: 18 (04-24)  SpO2: 96% (04-24)  I&O's Summary    22 Apr 2018 07:01  -  23 Apr 2018 07:00  --------------------------------------------------------  IN: 1200 mL / OUT: 0 mL / NET: 1200 mL    23 Apr 2018 07:01  -  24 Apr 2018 05:33  --------------------------------------------------------  IN: 720 mL / OUT: 0 mL / NET: 720 mL        Physical Exam:  Appearance: No acute distress; well appearing  Eyes: PERRL, EOMI, pink conjunctiva  HENT: Normal oral mucosa  Cardiovascular: RRR, S1, S2, no murmurs, rubs, or gallops; no edema; no JVD  Respiratory: Clear to auscultation bilaterally  Gastrointestinal: soft, non-tender, non-distended with normal bowel sounds  Musculoskeletal: No clubbing; no joint deformity   Neurologic: Non-focal  Lymphatic: No lymphadenopathy  Psychiatry: AAOx3, mood & affect appropriate  Skin: No rashes, ecchymoses, or cyanosis                          15.4   5.22  )-----------( 194      ( 23 Apr 2018 07:30 )             43.7     04-23    138  |  102  |  16  ----------------------------<  96  4.3   |  25  |  0.93    Ca    9.6      23 Apr 2018 06:28  Phos  3.3     04-22  Mg     2.0     04-22    TPro  8.0  /  Alb  4.2  /  TBili  0.6  /  DBili  x   /  AST  31  /  ALT  25  /  AlkPhos  107  04-22      CARDIAC MARKERS ( 22 Apr 2018 06:54 )  x     / 0.75 ng/mL / 102 U/L / x     / 7.2 ng/mL      Serum Pro-Brain Natriuretic Peptide: 67 pg/mL (04-20 @ 13:50)          New ECG(s): Personally reviewed    Echo:    Stress Testing:     Cath:    New Imaging:    Interpretation of Telemetry: Patient seen and examined at bedside on 4 Eric    Overnight Events: The patient had pain at the R radial access site and was given Tylenol.     REVIEW OF SYSTEMS:  CONSTITUTIONAL: No weakness, fevers or chills  EYES/ENT: No visual changes;  No dysphagia  NECK: No pain or stiffness  RESPIRATORY: No cough, wheezing, hemoptysis; No shortness of breath - he was able to walk around the floor yesterday with his wife without symptoms  CARDIOVASCULAR: No chest pain or palpitations; No lower extremity edema  GASTROINTESTINAL: No abdominal or epigastric pain. No nausea, vomiting, or hematemesis; No diarrhea or constipation. No melena or hematochezia.  BACK: No back pain  GENITOURINARY: No dysuria, frequency or hematuria  NEUROLOGICAL: No numbness or weakness, no dizziness when standing  SKIN: No itching, burning, rashes, or lesions   All other review of systems is negative unless indicated above.            Current Meds:  acetaminophen   Tablet. 650 milliGRAM(s) Oral every 6 hours PRN  aspirin enteric coated 81 milliGRAM(s) Oral daily  atorvastatin 80 milliGRAM(s) Oral at bedtime  gabapentin 600 milliGRAM(s) Oral two times a day  hydroxychloroquine 200 milliGRAM(s) Oral two times a day  levothyroxine 25 MICROGram(s) Oral daily  metoprolol tartrate 25 milliGRAM(s) Oral two times a day  pantoprazole    Tablet 40 milliGRAM(s) Oral before breakfast  predniSONE   Tablet 5 milliGRAM(s) Oral daily  ticagrelor 90 milliGRAM(s) Oral two times a day    Vitals:  T(F): 98 (04-24), Max: 98.1 (04-23)  HR: 71 (04-24) (63 - 88)  BP: 98/64 (04-24) (98/64 - 114/70)  RR: 18 (04-24)  SpO2: 96% on RA    Physical Exam:  Appearance: No acute distress; well appearing  Eyes: PERRL, EOMI, pink conjunctiva  HENT: Normal oral mucosa  Cardiovascular: RRR, S1, S2, no murmurs, rubs, or gallops; no edema; no JVD  Respiratory: Clear to auscultation bilaterally  Gastrointestinal: soft, non-tender, non-distended with normal bowel sounds  Musculoskeletal: No clubbing; no joint deformity   Neurologic: Non-focal  Lymphatic: No lymphadenopathy  Psychiatry: AAOx3, mood & affect appropriate  Skin: No rashes, ecchymoses, or cyanosis  Vasc: R radial access site C/D/I, no hematoma or ecchymosis, good cap refill    AM LABS PENDING    Cath: s/p PCI to LAD on 4/23 - official report pending    Interpretation of Telemetry: Sinus 60s - 80s Patient seen and examined at bedside on 4 Eric    Overnight Events: The patient had pain at the R radial access site and was given Tylenol.  Alert, no distress; no cardiac sxs.    REVIEW OF SYSTEMS:  CONSTITUTIONAL: No weakness, fevers or chills  EYES/ENT: No visual changes;  No dysphagia  NECK: No pain or stiffness  RESPIRATORY: No cough, wheezing, hemoptysis; No shortness of breath - he was able to walk around the floor yesterday with his wife without symptoms  CARDIOVASCULAR: No chest pain or palpitations; No lower extremity edema  GASTROINTESTINAL: No abdominal or epigastric pain. No nausea, vomiting, or hematemesis; No diarrhea or constipation. No melena or hematochezia.  BACK: No back pain  GENITOURINARY: No dysuria, frequency or hematuria  NEUROLOGICAL: No numbness or weakness, no dizziness when standing  SKIN: No itching, burning, rashes, or lesions   All other review of systems is negative unless indicated above.            Current Meds:  acetaminophen   Tablet. 650 milliGRAM(s) Oral every 6 hours PRN  aspirin enteric coated 81 milliGRAM(s) Oral daily  atorvastatin 80 milliGRAM(s) Oral at bedtime  gabapentin 600 milliGRAM(s) Oral two times a day  hydroxychloroquine 200 milliGRAM(s) Oral two times a day  levothyroxine 25 MICROGram(s) Oral daily  metoprolol tartrate 25 milliGRAM(s) Oral two times a day  pantoprazole    Tablet 40 milliGRAM(s) Oral before breakfast  predniSONE   Tablet 5 milliGRAM(s) Oral daily  ticagrelor 90 milliGRAM(s) Oral two times a day    Vitals:  T(F): 98 (04-24), Max: 98.1 (04-23)  HR: 71 (04-24) (63 - 88)  BP: 98/64 (04-24) (98/64 - 114/70)  RR: 18 (04-24)  SpO2: 96% on RA    Physical Exam:  Appearance: No acute distress; well appearing  Eyes: PERRL, EOMI, pink conjunctiva  HENT: Normal oral mucosa  Cardiovascular: RRR, S1, S2, no murmurs, rubs, or gallops; no edema; no JVD  Respiratory: Clear to auscultation bilaterally  Gastrointestinal: soft, non-tender, non-distended with normal bowel sounds  Musculoskeletal: No clubbing; no joint deformity   Neurologic: Non-focal  Lymphatic: No lymphadenopathy  Psychiatry: AAOx3, mood & affect appropriate  Skin: No rashes, ecchymoses, or cyanosis  Vasc: R radial access site C/D/I, no hematoma or ecchymosis, good cap refill      Interpretation of Telemetry: Sinus 60s - 80s      Cath: s/p PCI to LAD on 4/23 - official report pending

## 2018-04-24 NOTE — PROGRESS NOTE ADULT - PROBLEM SELECTOR PLAN 5
-HbA1c 5.8.   -Diet and exercise counseling.

## 2018-04-24 NOTE — PROGRESS NOTE ADULT - ATTENDING COMMENTS
Jhonatan Delgadillo MD  Division of [facility] Medicine  Cell: [phone]  Pager: [phone]  Office: [phone]
Patient is seen and examined with fellow, NP and the CCU house-staff. I agree with the history, physical and the assessment and plan.  STEMI s/p PCI with ISABELLA to RCA  - educated on the importance of DAPT   - Ace-inhibitor initiated  - Beta-blocker initiated  - patient is on Lipitor 80 mg daily  - trend cardiac enzymes  - TTE prior to discharge  - staged PCI on Monday
D/c time 40 mins  F/u with Dr. Moses as outpt

## 2018-04-24 NOTE — PROGRESS NOTE ADULT - PROBLEM SELECTOR PLAN 3
-C/w atorvastatin 80mg at bedtime.

## 2018-04-24 NOTE — PROGRESS NOTE ADULT - SUBJECTIVE AND OBJECTIVE BOX
Patient is a 52y old  Male who presents with a chief complaint of Chest pain, STEMI (23 Apr 2018 14:27)      SUBJECTIVE / OVERNIGHT EVENTS:  C/o some dizziness. some mild episodes of SOB when ambulating (intermittently not always)  No events on tele.     MEDICATIONS  (STANDING):  aspirin enteric coated 81 milliGRAM(s) Oral daily  atorvastatin 80 milliGRAM(s) Oral at bedtime  clopidogrel Tablet 75 milliGRAM(s) Oral daily  gabapentin 600 milliGRAM(s) Oral two times a day  hydroxychloroquine 200 milliGRAM(s) Oral two times a day  levothyroxine 25 MICROGram(s) Oral daily  metoprolol tartrate 25 milliGRAM(s) Oral two times a day  pantoprazole    Tablet 40 milliGRAM(s) Oral before breakfast  predniSONE   Tablet 5 milliGRAM(s) Oral daily    MEDICATIONS  (PRN):  acetaminophen   Tablet. 650 milliGRAM(s) Oral every 6 hours PRN Mild Pain (1 - 3)      Vital Signs Last 24 Hrs  T(C): 36.6 (24 Apr 2018 11:16), Max: 36.7 (23 Apr 2018 20:34)  T(F): 97.8 (24 Apr 2018 11:16), Max: 98.1 (23 Apr 2018 20:34)  HR: 94 (24 Apr 2018 11:16) (63 - 94)  BP: 106/71 (24 Apr 2018 11:16) (98/64 - 114/70)  BP(mean): --  RR: 18 (24 Apr 2018 11:16) (18 - 18)  SpO2: 95% (24 Apr 2018 11:16) (95% - 97%)  CAPILLARY BLOOD GLUCOSE        I&O's Summary    23 Apr 2018 07:01  -  24 Apr 2018 07:00  --------------------------------------------------------  IN: 1200 mL / OUT: 0 mL / NET: 1200 mL    24 Apr 2018 07:01  -  24 Apr 2018 12:02  --------------------------------------------------------  IN: 240 mL / OUT: 0 mL / NET: 240 mL          PHYSICAL EXAM  GENERAL: NAD, well-developed  HEAD:  Atraumatic, Normocephalic  EYES: EOMI, conjunctiva and sclera clear  NECK: Supple, No JVD  CHEST/LUNG: Clear to auscultation bilaterally; No wheeze  HEART: Regular rate and rhythm; No murmurs, rubs, or gallops  ABDOMEN: Soft, Nontender, Nondistended; Bowel sounds present  EXTREMITIES:  2+ Peripheral Pulses, No clubbing, cyanosis, or edema  PSYCH: AAOx3  SKIN: No rashes or lesions    LABS:                        15.4   5.22  )-----------( 194      ( 23 Apr 2018 07:30 )             43.7     04-23    138  |  102  |  16  ----------------------------<  96  4.3   |  25  |  0.93    Ca    9.6      23 Apr 2018 06:28                  RADIOLOGY & ADDITIONAL TESTS:    Imaging Personally Reviewed:  Consultant(s) Notes Reviewed:   card  Care Discussed with Consultants/Other Providers:

## 2018-04-24 NOTE — PROGRESS NOTE ADULT - PROBLEM SELECTOR PROBLEM 2
Systemic lupus erythematosus, unspecified SLE type, unspecified organ involvement status

## 2018-04-24 NOTE — PROGRESS NOTE ADULT - PROBLEM SELECTOR PLAN 7
-SCD's for DVT PPx. Ambulate as tolerated.  -C/w PPI daily.

## 2018-04-24 NOTE — PROGRESS NOTE ADULT - PROBLEM SELECTOR PROBLEM 1
STEMI involving oth coronary artery of inferior wall
STEMI involving right coronary artery

## 2018-05-09 ENCOUNTER — NON-APPOINTMENT (OUTPATIENT)
Age: 53
End: 2018-05-09

## 2018-05-09 ENCOUNTER — APPOINTMENT (OUTPATIENT)
Dept: CARDIOLOGY | Facility: CLINIC | Age: 53
End: 2018-05-09
Payer: MEDICARE

## 2018-05-09 VITALS
HEIGHT: 75 IN | HEART RATE: 66 BPM | WEIGHT: 198 LBS | OXYGEN SATURATION: 98 % | BODY MASS INDEX: 24.62 KG/M2 | DIASTOLIC BLOOD PRESSURE: 68 MMHG | SYSTOLIC BLOOD PRESSURE: 102 MMHG

## 2018-05-09 PROCEDURE — 99214 OFFICE O/P EST MOD 30 MIN: CPT

## 2018-05-09 PROCEDURE — 93000 ELECTROCARDIOGRAM COMPLETE: CPT

## 2018-06-13 ENCOUNTER — FORM ENCOUNTER (OUTPATIENT)
Age: 53
End: 2018-06-13

## 2018-06-14 ENCOUNTER — OUTPATIENT (OUTPATIENT)
Dept: OUTPATIENT SERVICES | Facility: HOSPITAL | Age: 53
LOS: 1 days | End: 2018-06-14
Payer: COMMERCIAL

## 2018-06-14 ENCOUNTER — APPOINTMENT (OUTPATIENT)
Dept: CT IMAGING | Facility: IMAGING CENTER | Age: 53
End: 2018-06-14
Payer: COMMERCIAL

## 2018-06-14 ENCOUNTER — APPOINTMENT (OUTPATIENT)
Dept: PULMONOLOGY | Facility: CLINIC | Age: 53
End: 2018-06-14
Payer: COMMERCIAL

## 2018-06-14 VITALS
HEART RATE: 73 BPM | WEIGHT: 182 LBS | OXYGEN SATURATION: 98 % | BODY MASS INDEX: 24.65 KG/M2 | TEMPERATURE: 97.8 F | DIASTOLIC BLOOD PRESSURE: 60 MMHG | HEIGHT: 72 IN | RESPIRATION RATE: 15 BRPM | SYSTOLIC BLOOD PRESSURE: 104 MMHG

## 2018-06-14 VITALS — WEIGHT: 183 LBS | BODY MASS INDEX: 24.25 KG/M2 | HEIGHT: 72.83 IN

## 2018-06-14 DIAGNOSIS — J68.4 CHRONIC RESPIRATORY CONDITIONS DUE TO CHEMICALS, GASES, FUMES AND VAPORS: ICD-10-CM

## 2018-06-14 DIAGNOSIS — J45.40 MODERATE PERSISTENT ASTHMA, UNCOMPLICATED: ICD-10-CM

## 2018-06-14 PROCEDURE — 94729 DIFFUSING CAPACITY: CPT

## 2018-06-14 PROCEDURE — 71250 CT THORAX DX C-: CPT | Mod: 26

## 2018-06-14 PROCEDURE — ZZZZZ: CPT

## 2018-06-14 PROCEDURE — 99214 OFFICE O/P EST MOD 30 MIN: CPT | Mod: 25

## 2018-06-14 PROCEDURE — 94726 PLETHYSMOGRAPHY LUNG VOLUMES: CPT

## 2018-06-14 PROCEDURE — 94060 EVALUATION OF WHEEZING: CPT

## 2018-06-14 PROCEDURE — 71250 CT THORAX DX C-: CPT

## 2018-06-20 ENCOUNTER — RX RENEWAL (OUTPATIENT)
Age: 53
End: 2018-06-20

## 2018-06-22 ENCOUNTER — RX RENEWAL (OUTPATIENT)
Age: 53
End: 2018-06-22

## 2018-07-10 ENCOUNTER — APPOINTMENT (OUTPATIENT)
Dept: PULMONOLOGY | Facility: CLINIC | Age: 53
End: 2018-07-10
Payer: COMMERCIAL

## 2018-07-10 VITALS
DIASTOLIC BLOOD PRESSURE: 60 MMHG | TEMPERATURE: 100 F | SYSTOLIC BLOOD PRESSURE: 110 MMHG | BODY MASS INDEX: 24.12 KG/M2 | HEIGHT: 73 IN | OXYGEN SATURATION: 97 % | HEART RATE: 86 BPM | WEIGHT: 182 LBS | RESPIRATION RATE: 16 BRPM

## 2018-07-10 PROCEDURE — 99214 OFFICE O/P EST MOD 30 MIN: CPT

## 2018-07-17 PROBLEM — J98.4 OTHER DISORDERS OF LUNG: Chronic | Status: ACTIVE | Noted: 2018-04-20

## 2018-07-18 ENCOUNTER — APPOINTMENT (OUTPATIENT)
Dept: CARDIOLOGY | Facility: CLINIC | Age: 53
End: 2018-07-18
Payer: MEDICARE

## 2018-07-18 ENCOUNTER — NON-APPOINTMENT (OUTPATIENT)
Age: 53
End: 2018-07-18

## 2018-07-18 VITALS — SYSTOLIC BLOOD PRESSURE: 94 MMHG | OXYGEN SATURATION: 97 % | DIASTOLIC BLOOD PRESSURE: 56 MMHG | HEART RATE: 94 BPM

## 2018-07-18 DIAGNOSIS — I25.10 ATHEROSCLEROTIC HEART DISEASE OF NATIVE CORONARY ARTERY W/OUT ANGINA PECTORIS: ICD-10-CM

## 2018-07-18 DIAGNOSIS — I21.19 ST ELEVATION (STEMI) MYOCARDIAL INFARCTION INVOLVING OTHER CORONARY ARTERY OF INFERIOR WALL: ICD-10-CM

## 2018-07-18 DIAGNOSIS — E78.5 HYPERLIPIDEMIA, UNSPECIFIED: ICD-10-CM

## 2018-07-18 PROCEDURE — 99214 OFFICE O/P EST MOD 30 MIN: CPT

## 2018-07-18 PROCEDURE — 93000 ELECTROCARDIOGRAM COMPLETE: CPT

## 2018-07-22 PROBLEM — I21.19 INFERIOR MYOCARDIAL INFARCTION: Status: ACTIVE | Noted: 2018-05-10

## 2018-07-22 PROBLEM — E78.5 HYPERLIPIDEMIA, UNSPECIFIED HYPERLIPIDEMIA TYPE: Status: ACTIVE | Noted: 2018-05-10

## 2018-07-22 PROBLEM — I25.10 2-VESSEL CORONARY ARTERY DISEASE: Status: ACTIVE | Noted: 2018-05-10

## 2018-07-27 PROBLEM — E78.5 HYPERLIPIDEMIA, UNSPECIFIED: Chronic | Status: ACTIVE | Noted: 2018-04-20

## 2018-07-27 PROBLEM — M10.9 GOUT, UNSPECIFIED: Chronic | Status: ACTIVE | Noted: 2018-04-20

## 2018-07-27 PROBLEM — E03.9 HYPOTHYROIDISM, UNSPECIFIED: Chronic | Status: ACTIVE | Noted: 2018-04-20

## 2018-07-27 PROBLEM — M32.9 SYSTEMIC LUPUS ERYTHEMATOSUS, UNSPECIFIED: Chronic | Status: ACTIVE | Noted: 2018-04-20

## 2018-08-09 ENCOUNTER — OUTPATIENT (OUTPATIENT)
Dept: OUTPATIENT SERVICES | Facility: HOSPITAL | Age: 53
LOS: 1 days | End: 2018-08-09
Payer: MEDICARE

## 2018-08-09 ENCOUNTER — APPOINTMENT (OUTPATIENT)
Dept: CV DIAGNOSITCS | Facility: HOSPITAL | Age: 53
End: 2018-08-09

## 2018-08-09 DIAGNOSIS — E78.5 HYPERLIPIDEMIA, UNSPECIFIED: ICD-10-CM

## 2018-08-09 PROCEDURE — 0399T: CPT

## 2018-08-09 PROCEDURE — 93306 TTE W/DOPPLER COMPLETE: CPT | Mod: 26

## 2018-08-09 PROCEDURE — 93306 TTE W/DOPPLER COMPLETE: CPT

## 2018-08-22 LAB
ALBUMIN SERPL ELPH-MCNC: 3.9 G/DL
ALP BLD-CCNC: 141 U/L
ALT SERPL-CCNC: 18 U/L
ANION GAP SERPL CALC-SCNC: 13 MMOL/L
AST SERPL-CCNC: 19 U/L
BILIRUB SERPL-MCNC: 0.3 MG/DL
BUN SERPL-MCNC: 14 MG/DL
CALCIUM SERPL-MCNC: 8.8 MG/DL
CHLORIDE SERPL-SCNC: 103 MMOL/L
CHOLEST SERPL-MCNC: 107 MG/DL
CHOLEST/HDLC SERPL: 4.9 RATIO
CO2 SERPL-SCNC: 23 MMOL/L
CREAT SERPL-MCNC: 0.84 MG/DL
GLUCOSE SERPL-MCNC: 116 MG/DL
HDLC SERPL-MCNC: 22 MG/DL
LDLC SERPL CALC-MCNC: 64 MG/DL
POTASSIUM SERPL-SCNC: 4.1 MMOL/L
PROT SERPL-MCNC: 7.6 G/DL
SODIUM SERPL-SCNC: 139 MMOL/L
TRIGL SERPL-MCNC: 104 MG/DL

## 2018-10-12 ENCOUNTER — OTHER (OUTPATIENT)
Age: 53
End: 2018-10-12

## 2018-11-01 ENCOUNTER — FORM ENCOUNTER (OUTPATIENT)
Age: 53
End: 2018-11-01

## 2018-11-06 ENCOUNTER — APPOINTMENT (OUTPATIENT)
Dept: PULMONOLOGY | Facility: CLINIC | Age: 53
End: 2018-11-06

## 2019-03-01 ENCOUNTER — RX RENEWAL (OUTPATIENT)
Age: 54
End: 2019-03-01

## 2019-03-01 RX ORDER — ATORVASTATIN CALCIUM 80 MG/1
80 TABLET, FILM COATED ORAL
Qty: 90 | Refills: 1 | Status: ACTIVE | COMMUNITY
Start: 2019-03-01 | End: 1900-01-01

## 2019-03-26 ENCOUNTER — APPOINTMENT (OUTPATIENT)
Dept: OTHER | Facility: CLINIC | Age: 54
End: 2019-03-26
Payer: COMMERCIAL

## 2019-03-26 VITALS
RESPIRATION RATE: 16 BRPM | DIASTOLIC BLOOD PRESSURE: 70 MMHG | OXYGEN SATURATION: 98 % | SYSTOLIC BLOOD PRESSURE: 100 MMHG | BODY MASS INDEX: 25.18 KG/M2 | WEIGHT: 190 LBS | HEART RATE: 66 BPM | HEIGHT: 73 IN

## 2019-03-26 PROCEDURE — 99214 OFFICE O/P EST MOD 30 MIN: CPT | Mod: 25

## 2019-03-26 PROCEDURE — 96150: CPT

## 2019-03-26 PROCEDURE — 99396 PREV VISIT EST AGE 40-64: CPT | Mod: 25

## 2019-03-26 RX ORDER — MELOXICAM 15 MG/1
15 TABLET ORAL
Qty: 90 | Refills: 0 | Status: COMPLETED | COMMUNITY
Start: 2018-02-01 | End: 2019-03-26

## 2019-03-26 RX ORDER — PREDNISONE 2.5 MG/1
2.5 TABLET ORAL
Qty: 90 | Refills: 0 | Status: COMPLETED | COMMUNITY
Start: 2018-02-02 | End: 2019-03-26

## 2019-03-26 RX ORDER — SIMVASTATIN 40 MG/1
40 TABLET, FILM COATED ORAL
Qty: 90 | Refills: 0 | Status: COMPLETED | COMMUNITY
Start: 2017-04-20 | End: 2019-03-26

## 2019-03-27 LAB
ALBUMIN SERPL ELPH-MCNC: 4.7 G/DL
ALP BLD-CCNC: 83 U/L
ALT SERPL-CCNC: 22 U/L
ANION GAP SERPL CALC-SCNC: 13 MMOL/L
AST SERPL-CCNC: 20 U/L
BILIRUB SERPL-MCNC: 0.4 MG/DL
BUN SERPL-MCNC: 12 MG/DL
CALCIUM SERPL-MCNC: 9.8 MG/DL
CHLORIDE SERPL-SCNC: 104 MMOL/L
CHOLEST SERPL-MCNC: 146 MG/DL
CHOLEST/HDLC SERPL: 2.5 RATIO
CO2 SERPL-SCNC: 25 MMOL/L
CREAT SERPL-MCNC: 0.78 MG/DL
GLUCOSE SERPL-MCNC: 101 MG/DL
HDLC SERPL-MCNC: 58 MG/DL
LDLC SERPL CALC-MCNC: 74 MG/DL
POTASSIUM SERPL-SCNC: 4.4 MMOL/L
PROT SERPL-MCNC: 7.2 G/DL
SODIUM SERPL-SCNC: 142 MMOL/L
TRIGL SERPL-MCNC: 68 MG/DL

## 2019-04-25 ENCOUNTER — APPOINTMENT (OUTPATIENT)
Dept: PSYCHIATRY | Facility: CLINIC | Age: 54
End: 2019-04-25
Payer: COMMERCIAL

## 2019-04-25 PROCEDURE — 90791 PSYCH DIAGNOSTIC EVALUATION: CPT

## 2019-05-15 ENCOUNTER — RX RENEWAL (OUTPATIENT)
Age: 54
End: 2019-05-15

## 2019-05-20 ENCOUNTER — RX RENEWAL (OUTPATIENT)
Age: 54
End: 2019-05-20

## 2019-06-11 ENCOUNTER — APPOINTMENT (OUTPATIENT)
Dept: CT IMAGING | Facility: IMAGING CENTER | Age: 54
End: 2019-06-11

## 2019-06-13 ENCOUNTER — APPOINTMENT (OUTPATIENT)
Dept: DERMATOLOGY | Facility: CLINIC | Age: 54
End: 2019-06-13

## 2019-06-14 ENCOUNTER — APPOINTMENT (OUTPATIENT)
Dept: CT IMAGING | Facility: IMAGING CENTER | Age: 54
End: 2019-06-14
Payer: COMMERCIAL

## 2019-06-14 ENCOUNTER — OUTPATIENT (OUTPATIENT)
Dept: OUTPATIENT SERVICES | Facility: HOSPITAL | Age: 54
LOS: 1 days | End: 2019-06-14
Payer: COMMERCIAL

## 2019-06-14 DIAGNOSIS — J68.4 CHRONIC RESPIRATORY CONDITIONS DUE TO CHEMICALS, GASES, FUMES AND VAPORS: ICD-10-CM

## 2019-06-14 PROCEDURE — 71250 CT THORAX DX C-: CPT | Mod: 26

## 2019-06-14 PROCEDURE — 71250 CT THORAX DX C-: CPT

## 2019-06-17 ENCOUNTER — RX RENEWAL (OUTPATIENT)
Age: 54
End: 2019-06-17

## 2019-08-26 ENCOUNTER — FORM ENCOUNTER (OUTPATIENT)
Age: 54
End: 2019-08-26

## 2019-09-10 ENCOUNTER — APPOINTMENT (OUTPATIENT)
Dept: PULMONOLOGY | Facility: CLINIC | Age: 54
End: 2019-09-10

## 2019-09-27 ENCOUNTER — MEDICATION RENEWAL (OUTPATIENT)
Age: 54
End: 2019-09-27

## 2019-10-01 ENCOUNTER — APPOINTMENT (OUTPATIENT)
Dept: PULMONOLOGY | Facility: CLINIC | Age: 54
End: 2019-10-01

## 2019-10-17 ENCOUNTER — APPOINTMENT (OUTPATIENT)
Dept: DERMATOLOGY | Facility: CLINIC | Age: 54
End: 2019-10-17
Payer: COMMERCIAL

## 2019-10-17 VITALS
BODY MASS INDEX: 25.18 KG/M2 | HEIGHT: 73 IN | DIASTOLIC BLOOD PRESSURE: 71 MMHG | HEART RATE: 65 BPM | SYSTOLIC BLOOD PRESSURE: 103 MMHG | OXYGEN SATURATION: 98 % | WEIGHT: 190 LBS

## 2019-10-17 DIAGNOSIS — Z82.49 FAMILY HISTORY OF ISCHEMIC HEART DISEASE AND OTHER DISEASES OF THE CIRCULATORY SYSTEM: ICD-10-CM

## 2019-10-17 DIAGNOSIS — L82.1 OTHER SEBORRHEIC KERATOSIS: ICD-10-CM

## 2019-10-17 DIAGNOSIS — Z78.9 OTHER SPECIFIED HEALTH STATUS: ICD-10-CM

## 2019-10-17 DIAGNOSIS — M32.9 SYSTEMIC LUPUS ERYTHEMATOSUS, UNSPECIFIED: ICD-10-CM

## 2019-10-17 DIAGNOSIS — L57.0 ACTINIC KERATOSIS: ICD-10-CM

## 2019-10-17 DIAGNOSIS — Z87.39 PERSONAL HISTORY OF OTHER DISEASES OF THE MUSCULOSKELETAL SYSTEM AND CONNECTIVE TISSUE: ICD-10-CM

## 2019-10-17 PROCEDURE — 17000 DESTRUCT PREMALG LESION: CPT | Mod: GC

## 2019-10-17 PROCEDURE — 99213 OFFICE O/P EST LOW 20 MIN: CPT | Mod: GC,25

## 2019-10-17 PROCEDURE — 17003 DESTRUCT PREMALG LES 2-14: CPT | Mod: GC

## 2020-01-22 ENCOUNTER — RX RENEWAL (OUTPATIENT)
Age: 55
End: 2020-01-22

## 2020-01-27 ENCOUNTER — APPOINTMENT (OUTPATIENT)
Dept: PULMONOLOGY | Facility: CLINIC | Age: 55
End: 2020-01-27
Payer: COMMERCIAL

## 2020-01-27 VITALS
HEIGHT: 73 IN | DIASTOLIC BLOOD PRESSURE: 75 MMHG | HEART RATE: 62 BPM | SYSTOLIC BLOOD PRESSURE: 117 MMHG | OXYGEN SATURATION: 95 % | WEIGHT: 198 LBS | BODY MASS INDEX: 26.24 KG/M2 | TEMPERATURE: 97.9 F | RESPIRATION RATE: 18 BRPM

## 2020-01-27 DIAGNOSIS — R06.09 OTHER FORMS OF DYSPNEA: ICD-10-CM

## 2020-01-27 DIAGNOSIS — R05 COUGH: ICD-10-CM

## 2020-01-27 PROCEDURE — ZZZZZ: CPT

## 2020-01-27 PROCEDURE — 94726 PLETHYSMOGRAPHY LUNG VOLUMES: CPT

## 2020-01-27 PROCEDURE — 94729 DIFFUSING CAPACITY: CPT

## 2020-01-27 PROCEDURE — 99214 OFFICE O/P EST MOD 30 MIN: CPT | Mod: 25

## 2020-01-27 PROCEDURE — 94010 BREATHING CAPACITY TEST: CPT

## 2020-01-27 NOTE — REVIEW OF SYSTEMS
[As Noted in HPI] : as noted in HPI [Heartburn] : heartburn [Arthralgias] : arthralgias [Cough] : cough [Dyspnea] : dyspnea [Wheezing] : wheezing [Hypotension] : hypotension [Negative] : Respiratory [FreeTextEntry6] : stiffness,

## 2020-01-27 NOTE — ASSESSMENT
[FreeTextEntry1] : 1. Cough: Will prescribe Fluticasone nasal spray for nasal congestion/PND. Patient educated on administration and importance of daily maintenance use for efficacy. Will also start patient on zyrtec OTC.\par \par 2. Dyspnea: No major change in symptoms on symbicort; will continue unchanged. PFT demonstrates no evidence for pulmonary etiology.\par \par 3. Dizziness: Unable to complete PFT as he briefly syncopated during intense episodes of exhalation. Told to discuss further with cardiologist and PMD.

## 2020-01-27 NOTE — HISTORY OF PRESENT ILLNESS
[FreeTextEntry1] : Since last visit no change in breathing. Continues to be on symbicort without much change. Notes extremes in weather, including heat, bother him. Patient notes that this is the first week that he is entirely off Prednisone for the lupus. Notes some stiffness but otherwise no change. Doing some mild exercise and walking. Walks the dogs every day\par \par Episode of pleurisy in 12/2018, admitted to [facility]. on prednisone for lupus starting 3 years ago. MI. taken off prednisone in November 2019 for pleurisy. no change in breathing. Dyspnea on exertion and while talking, coughing, wheezing, chest tightness.\par \par Notes nasal congestion, possible postnasal drip. Also hx of GERD, on PPI daily.

## 2020-04-07 ENCOUNTER — RX RENEWAL (OUTPATIENT)
Age: 55
End: 2020-04-07

## 2020-04-17 ENCOUNTER — APPOINTMENT (OUTPATIENT)
Dept: OTHER | Facility: CLINIC | Age: 55
End: 2020-04-17
Payer: COMMERCIAL

## 2020-04-17 PROCEDURE — 99396 PREV VISIT EST AGE 40-64: CPT

## 2020-04-17 PROCEDURE — 99442: CPT

## 2020-04-17 RX ORDER — METOPROLOL SUCCINATE 50 MG/1
50 TABLET, EXTENDED RELEASE ORAL
Qty: 90 | Refills: 0 | Status: COMPLETED | COMMUNITY
Start: 2019-05-15 | End: 2020-04-17

## 2020-04-17 RX ORDER — CLOPIDOGREL BISULFATE 75 MG/1
75 TABLET, FILM COATED ORAL DAILY
Qty: 90 | Refills: 3 | Status: COMPLETED | COMMUNITY
Start: 2018-04-24 | End: 2020-04-17

## 2020-04-17 RX ORDER — PREDNISONE 5 MG/1
5 TABLET ORAL
Qty: 30 | Refills: 0 | Status: COMPLETED | COMMUNITY
Start: 2017-08-11 | End: 2020-04-17

## 2020-04-17 RX ORDER — NON-ADHERENT BANDAGE 3"X4"
0.65 BANDAGE TOPICAL TWICE DAILY
Qty: 1 | Refills: 1 | Status: ACTIVE | COMMUNITY
Start: 2020-04-17 | End: 1900-01-01

## 2020-04-17 RX ORDER — MELOXICAM 15 MG/1
15 TABLET ORAL
Qty: 90 | Refills: 0 | Status: COMPLETED | COMMUNITY
Start: 2018-02-01 | End: 2020-04-17

## 2020-06-01 ENCOUNTER — RX RENEWAL (OUTPATIENT)
Age: 55
End: 2020-06-01

## 2020-12-16 ENCOUNTER — RX RENEWAL (OUTPATIENT)
Age: 55
End: 2020-12-16

## 2021-02-27 ENCOUNTER — TRANSCRIPTION ENCOUNTER (OUTPATIENT)
Age: 56
End: 2021-02-27

## 2021-04-07 ENCOUNTER — TRANSCRIPTION ENCOUNTER (OUTPATIENT)
Age: 56
End: 2021-04-07

## 2021-06-21 ENCOUNTER — RX RENEWAL (OUTPATIENT)
Age: 56
End: 2021-06-21

## 2021-08-02 ENCOUNTER — APPOINTMENT (OUTPATIENT)
Dept: OTHER | Facility: CLINIC | Age: 56
End: 2021-08-02

## 2021-08-04 ENCOUNTER — NON-APPOINTMENT (OUTPATIENT)
Age: 56
End: 2021-08-04

## 2021-08-24 ENCOUNTER — NON-APPOINTMENT (OUTPATIENT)
Age: 56
End: 2021-08-24

## 2021-08-24 ENCOUNTER — FORM ENCOUNTER (OUTPATIENT)
Age: 56
End: 2021-08-24

## 2021-08-25 ENCOUNTER — FORM ENCOUNTER (OUTPATIENT)
Age: 56
End: 2021-08-25

## 2021-08-27 ENCOUNTER — APPOINTMENT (OUTPATIENT)
Dept: OTHER | Facility: CLINIC | Age: 56
End: 2021-08-27
Payer: COMMERCIAL

## 2021-08-27 ENCOUNTER — NON-APPOINTMENT (OUTPATIENT)
Age: 56
End: 2021-08-27

## 2021-08-27 PROCEDURE — 99443: CPT | Mod: 95

## 2021-09-14 ENCOUNTER — APPOINTMENT (OUTPATIENT)
Dept: OTHER | Facility: CLINIC | Age: 56
End: 2021-09-14

## 2021-09-18 ENCOUNTER — FORM ENCOUNTER (OUTPATIENT)
Age: 56
End: 2021-09-18

## 2021-09-20 ENCOUNTER — NON-APPOINTMENT (OUTPATIENT)
Age: 56
End: 2021-09-20

## 2021-10-04 ENCOUNTER — NON-APPOINTMENT (OUTPATIENT)
Age: 56
End: 2021-10-04

## 2021-11-10 ENCOUNTER — FORM ENCOUNTER (OUTPATIENT)
Age: 56
End: 2021-11-10

## 2021-12-07 ENCOUNTER — APPOINTMENT (OUTPATIENT)
Dept: OTHER | Facility: CLINIC | Age: 56
End: 2021-12-07
Payer: COMMERCIAL

## 2021-12-07 DIAGNOSIS — Z03.89 ENCOUNTER FOR OBSERVATION FOR OTHER SUSPECTED DISEASES AND CONDITIONS RULED OUT: ICD-10-CM

## 2021-12-07 DIAGNOSIS — Z12.9 ENCOUNTER FOR SCREENING FOR MALIGNANT NEOPLASM, SITE UNSPECIFIED: ICD-10-CM

## 2021-12-07 PROCEDURE — 99443: CPT | Mod: 95

## 2021-12-07 PROCEDURE — 99396 PREV VISIT EST AGE 40-64: CPT | Mod: 95

## 2021-12-07 RX ORDER — FLUTICASONE PROPIONATE 50 UG/1
50 SPRAY, METERED NASAL DAILY
Qty: 1 | Refills: 5 | Status: ACTIVE | COMMUNITY
Start: 2020-01-27

## 2021-12-07 RX ORDER — BUDESONIDE AND FORMOTEROL FUMARATE DIHYDRATE 80; 4.5 UG/1; UG/1
80-4.5 AEROSOL RESPIRATORY (INHALATION)
Qty: 3 | Refills: 1 | Status: COMPLETED | COMMUNITY
Start: 2018-06-14 | End: 2021-12-07

## 2022-05-03 ENCOUNTER — APPOINTMENT (OUTPATIENT)
Dept: GASTROENTEROLOGY | Facility: CLINIC | Age: 57
End: 2022-05-03
Payer: COMMERCIAL

## 2022-05-03 VITALS
OXYGEN SATURATION: 97 % | SYSTOLIC BLOOD PRESSURE: 108 MMHG | BODY MASS INDEX: 25.8 KG/M2 | WEIGHT: 201 LBS | HEIGHT: 74 IN | DIASTOLIC BLOOD PRESSURE: 70 MMHG | HEART RATE: 64 BPM

## 2022-05-03 DIAGNOSIS — Z86.010 PERSONAL HISTORY OF COLONIC POLYPS: ICD-10-CM

## 2022-05-03 DIAGNOSIS — R13.10 DYSPHAGIA, UNSPECIFIED: ICD-10-CM

## 2022-05-03 PROCEDURE — 99203 OFFICE O/P NEW LOW 30 MIN: CPT

## 2022-05-03 NOTE — HISTORY OF PRESENT ILLNESS
[de-identified] : 56-year-old male\par Longstanding heartburn well controlled on omeprazole\par Still intermittent dysphagia, perhaps worsening\par Dysphagia to solids not liquids\par Denies vomiting, denies weight loss, denies black or bloody bowel movements\par Last endoscopy 5 years ago unremarkable\par Colonoscopy at that time for screening purposes, single small adenoma, due for surveillance\par \par Recently diagnosed, treated with CyberKnife for prostate cancer\par \par Social history:

## 2022-05-03 NOTE — PHYSICAL EXAM
[General Appearance - Alert] : alert [General Appearance - In No Acute Distress] : in no acute distress [Sclera] : the sclera and conjunctiva were normal [PERRL With Normal Accommodation] : pupils were equal in size, round, and reactive to light [Extraocular Movements] : extraocular movements were intact [Outer Ear] : the ears and nose were normal in appearance [Oropharynx] : the oropharynx was normal [Neck Appearance] : the appearance of the neck was normal [Neck Cervical Mass (___cm)] : no neck mass was observed [Jugular Venous Distention Increased] : there was no jugular-venous distention [Thyroid Diffuse Enlargement] : the thyroid was not enlarged [Thyroid Nodule] : there were no palpable thyroid nodules [Auscultation Breath Sounds / Voice Sounds] : lungs were clear to auscultation bilaterally [Heart Rate And Rhythm] : heart rate was normal and rhythm regular [Heart Sounds] : normal S1 and S2 [Heart Sounds Gallop] : no gallops [Murmurs] : no murmurs [Heart Sounds Pericardial Friction Rub] : no pericardial rub [Edema] : there was no peripheral edema [Bowel Sounds] : normal bowel sounds [Abdomen Soft] : soft [Abdomen Tenderness] : non-tender [] : no hepato-splenomegaly [Abdomen Mass (___ Cm)] : no abdominal mass palpated [Oriented To Time, Place, And Person] : oriented to person, place, and time [Impaired Insight] : insight and judgment were intact [Affect] : the affect was normal

## 2022-05-03 NOTE — ASSESSMENT
[FreeTextEntry1] : Reflux, dysphagia, doubt but cannot rule out esophagitis, ring, neoplasm\par History of colon polyps due for surveillance\par \par Plan\par Patient educated regarding the indications risks benefits and alternatives to upper endoscopy and colonoscopy for evaluation of his complaints, colon cancer screening prevention purposes, polyp surveillance\par \par Patient agreeable

## 2022-06-21 ENCOUNTER — FORM ENCOUNTER (OUTPATIENT)
Age: 57
End: 2022-06-21

## 2022-07-11 ENCOUNTER — RX RENEWAL (OUTPATIENT)
Age: 57
End: 2022-07-11

## 2022-09-12 ENCOUNTER — APPOINTMENT (OUTPATIENT)
Dept: GASTROENTEROLOGY | Facility: HOSPITAL | Age: 57
End: 2022-09-12

## 2023-01-26 ENCOUNTER — APPOINTMENT (OUTPATIENT)
Dept: OTHER | Facility: CLINIC | Age: 58
End: 2023-01-26

## 2023-02-27 ENCOUNTER — RX RENEWAL (OUTPATIENT)
Age: 58
End: 2023-02-27

## 2023-03-14 ENCOUNTER — APPOINTMENT (OUTPATIENT)
Dept: OTHER | Facility: CLINIC | Age: 58
End: 2023-03-14
Payer: COMMERCIAL

## 2023-03-14 DIAGNOSIS — Z04.9 ENCOUNTER FOR EXAMINATION AND OBSERVATION FOR UNSPECIFIED REASON: ICD-10-CM

## 2023-03-14 DIAGNOSIS — J68.4 CHRONIC RESPIRATORY CONDITIONS DUE TO CHEMICALS, GASES, FUMES AND VAPORS: ICD-10-CM

## 2023-03-14 DIAGNOSIS — Z28.21 IMMUNIZATION NOT CARRIED OUT BECAUSE OF PATIENT REFUSAL: ICD-10-CM

## 2023-03-14 DIAGNOSIS — J32.9 CHRONIC SINUSITIS, UNSPECIFIED: ICD-10-CM

## 2023-03-14 DIAGNOSIS — K21.9 GASTRO-ESOPHAGEAL REFLUX DISEASE W/OUT ESOPHAGITIS: ICD-10-CM

## 2023-03-14 DIAGNOSIS — C61 MALIGNANT NEOPLASM OF PROSTATE: ICD-10-CM

## 2023-03-14 PROCEDURE — 99214 OFFICE O/P EST MOD 30 MIN: CPT | Mod: 95,25

## 2023-03-14 PROCEDURE — 99396 PREV VISIT EST AGE 40-64: CPT | Mod: 95

## 2023-07-13 ENCOUNTER — RX RENEWAL (OUTPATIENT)
Age: 58
End: 2023-07-13

## 2023-07-17 RX ORDER — SODIUM SULFATE, POTASSIUM SULFATE AND MAGNESIUM SULFATE 1.6; 3.13; 17.5 G/177ML; G/177ML; G/177ML
17.5-3.13-1.6 SOLUTION ORAL
Qty: 1 | Refills: 0 | Status: ACTIVE | COMMUNITY
Start: 2022-05-03 | End: 1900-01-01

## 2023-07-31 ENCOUNTER — RESULT REVIEW (OUTPATIENT)
Age: 58
End: 2023-07-31

## 2023-07-31 ENCOUNTER — TRANSCRIPTION ENCOUNTER (OUTPATIENT)
Age: 58
End: 2023-07-31

## 2023-07-31 ENCOUNTER — APPOINTMENT (OUTPATIENT)
Dept: GASTROENTEROLOGY | Facility: HOSPITAL | Age: 58
End: 2023-07-31

## 2023-07-31 ENCOUNTER — OUTPATIENT (OUTPATIENT)
Dept: OUTPATIENT SERVICES | Facility: HOSPITAL | Age: 58
LOS: 1 days | End: 2023-07-31
Payer: COMMERCIAL

## 2023-07-31 VITALS
DIASTOLIC BLOOD PRESSURE: 77 MMHG | TEMPERATURE: 98 F | HEART RATE: 68 BPM | OXYGEN SATURATION: 98 % | WEIGHT: 190.04 LBS | SYSTOLIC BLOOD PRESSURE: 115 MMHG | RESPIRATION RATE: 14 BRPM | HEIGHT: 73 IN

## 2023-07-31 VITALS
RESPIRATION RATE: 17 BRPM | DIASTOLIC BLOOD PRESSURE: 65 MMHG | SYSTOLIC BLOOD PRESSURE: 112 MMHG | OXYGEN SATURATION: 99 % | HEART RATE: 70 BPM

## 2023-07-31 DIAGNOSIS — D12.6 BENIGN NEOPLASM OF COLON, UNSPECIFIED: ICD-10-CM

## 2023-07-31 DIAGNOSIS — R13.10 DYSPHAGIA, UNSPECIFIED: ICD-10-CM

## 2023-07-31 PROCEDURE — 43239 EGD BIOPSY SINGLE/MULTIPLE: CPT

## 2023-07-31 PROCEDURE — 88342 IMHCHEM/IMCYTCHM 1ST ANTB: CPT | Mod: 26

## 2023-07-31 PROCEDURE — 45385 COLONOSCOPY W/LESION REMOVAL: CPT | Mod: PT

## 2023-07-31 PROCEDURE — 88341 IMHCHEM/IMCYTCHM EA ADD ANTB: CPT

## 2023-07-31 PROCEDURE — 88305 TISSUE EXAM BY PATHOLOGIST: CPT

## 2023-07-31 PROCEDURE — 88312 SPECIAL STAINS GROUP 1: CPT

## 2023-07-31 PROCEDURE — 88305 TISSUE EXAM BY PATHOLOGIST: CPT | Mod: 26

## 2023-07-31 PROCEDURE — 45385 COLONOSCOPY W/LESION REMOVAL: CPT

## 2023-07-31 PROCEDURE — 88312 SPECIAL STAINS GROUP 1: CPT | Mod: 26

## 2023-07-31 DEVICE — NET RETRV ROT ROTH 2.5MMX230CM: Type: IMPLANTABLE DEVICE | Status: FUNCTIONAL

## 2023-07-31 RX ORDER — SODIUM CHLORIDE 9 MG/ML
500 INJECTION INTRAMUSCULAR; INTRAVENOUS; SUBCUTANEOUS
Refills: 0 | Status: COMPLETED | OUTPATIENT
Start: 2023-07-31 | End: 2023-07-31

## 2023-07-31 RX ORDER — LEVOTHYROXINE SODIUM 125 MCG
1 TABLET ORAL
Qty: 0 | Refills: 0 | DISCHARGE

## 2023-07-31 RX ORDER — HYDROXYCHLOROQUINE SULFATE 200 MG
1 TABLET ORAL
Qty: 0 | Refills: 0 | DISCHARGE

## 2023-07-31 RX ORDER — OMEPRAZOLE 10 MG/1
1 CAPSULE, DELAYED RELEASE ORAL
Qty: 0 | Refills: 0 | DISCHARGE

## 2023-07-31 RX ORDER — GABAPENTIN 400 MG/1
1 CAPSULE ORAL
Qty: 0 | Refills: 0 | DISCHARGE

## 2023-07-31 RX ADMIN — SODIUM CHLORIDE 30 MILLILITER(S): 9 INJECTION INTRAMUSCULAR; INTRAVENOUS; SUBCUTANEOUS at 13:10

## 2023-07-31 NOTE — ASU PATIENT PROFILE, ADULT - WHEN WAS YOUR LAST VACCINATION? MONTH
December Ivermectin Counseling:  Patient instructed to take medication on an empty stomach with a full glass of water.  Patient informed of potential adverse effects including but not limited to nausea, diarrhea, dizziness, itching, and swelling of the extremities or lymph nodes.  The patient verbalized understanding of the proper use and possible adverse effects of ivermectin.  All of the patient's questions and concerns were addressed.

## 2023-07-31 NOTE — PRE-ANESTHESIA EVALUATION ADULT - HEART RATE (BEATS/MIN)
TU=076 bpm, JZGP=940/80 mmhg, SpO2=98.0 %, Resp=11 B/min, EtCO2=27 mmHg, Apnea=5 Seconds, Pain=0, Sharma=2 68

## 2023-07-31 NOTE — PRE PROCEDURE NOTE - PRE PROCEDURE EVALUATION
Attending Physician:        Curt Martin MD                    Procedure:    Indication for Procedure: dysphagia, history of polyps  ________________________________________________________  PAST MEDICAL & SURGICAL HISTORY:  Gout      Systemic lupus erythematosus, unspecified SLE type, unspecified organ involvement status      Hypothyroidism, unspecified type      Hyperlipidemia, unspecified hyperlipidemia type      Chronic lung disease  [employer] of work as  in 9/11      No significant past surgical history        ALLERGIES:  No Known Allergies    HOME MEDICATIONS:  gabapentin 600 mg oral tablet: 1 tab(s) orally 2 times a day  hydroxychloroquine 200 mg oral tablet: 1 tab(s) orally 2 times a day  levothyroxine 25 mcg (0.025 mg) oral tablet: 1 tab(s) orally once a day  omeprazole 40 mg oral delayed release capsule: 1 cap(s) orally once a day  predniSONE 5 mg oral tablet: 1 tab(s) orally once a day    AICD/PPM: [ ] yes   [ x] no    PERTINENT LAB DATA:                      PHYSICAL EXAMINATION:    T(C): --  HR: --  BP: --  RR: --  SpO2: --    Constitutional: NAD  HEENT: PERRLA, EOMI,    Neck:  No JVD  Respiratory: CTAB/L  Cardiovascular: S1 and S2  Gastrointestinal: BS+, soft, NT/ND  Extremities: No peripheral edema  Neurological: A/O x 3, no focal deficits  Psychiatric: Normal mood, normal affect  Skin: No rashes    ASA Class: I [ ]  II [x ]  III [ ]  IV [ ]    COMMENTS:    The patient is a suitable candidate for the planned procedure unless box checked [ ]  No, explain:

## 2023-07-31 NOTE — ASU PATIENT PROFILE, ADULT - NSICDXPASTMEDICALHX_GEN_ALL_CORE_FT
PAST MEDICAL HISTORY:  Chronic lung disease [employer] of work as  in 9/11    Gout     Hyperlipidemia, unspecified hyperlipidemia type     Hypothyroidism, unspecified type     Systemic lupus erythematosus, unspecified SLE type, unspecified organ involvement status

## 2023-07-31 NOTE — ASU PATIENT PROFILE, ADULT - FALL HARM RISK - UNIVERSAL INTERVENTIONS
Bed in lowest position, wheels locked, appropriate side rails in place/Call bell, personal items and telephone in reach/Instruct patient to call for assistance before getting out of bed or chair/Non-slip footwear when patient is out of bed/[place] to call system/Physically safe environment - no spills, clutter or unnecessary equipment/Purposeful Proactive Rounding/Room/bathroom lighting operational, light cord in reach

## 2023-08-08 LAB — SURGICAL PATHOLOGY STUDY: SIGNIFICANT CHANGE UP

## 2023-08-09 ENCOUNTER — NON-APPOINTMENT (OUTPATIENT)
Age: 58
End: 2023-08-09

## 2024-04-23 ENCOUNTER — NON-APPOINTMENT (OUTPATIENT)
Age: 59
End: 2024-04-23

## 2025-04-25 ENCOUNTER — APPOINTMENT (OUTPATIENT)
Age: 60
End: 2025-04-25
Payer: MEDICARE

## 2025-04-25 ENCOUNTER — NON-APPOINTMENT (OUTPATIENT)
Age: 60
End: 2025-04-25

## 2025-04-25 PROCEDURE — 92134 CPTRZ OPH DX IMG PST SGM RTA: CPT

## 2025-04-25 PROCEDURE — 92014 COMPRE OPH EXAM EST PT 1/>: CPT

## (undated) DEVICE — SOL INJ NS 0.9% 500ML 2 PORT

## (undated) DEVICE — IRRIGATOR BIO SHIELD

## (undated) DEVICE — TUBING SUCTION CONN 6FT STERILE

## (undated) DEVICE — FOLEY HOLDER STATLOCK 2 WAY ADULT

## (undated) DEVICE — PACK IV START WITH CHG

## (undated) DEVICE — FORCEP RADIAL JAW 4 JUMBO 2.8MM 3.2MM 240CM ORANGE DISP

## (undated) DEVICE — POLY TRAP ETRAP

## (undated) DEVICE — SUCTION YANKAUER NO CONTROL VENT

## (undated) DEVICE — BRUSH COLONOSCOPY CYTOLOGY

## (undated) DEVICE — SENSOR O2 FINGER ADULT

## (undated) DEVICE — CLAMP BX HOT RAD JAW 3

## (undated) DEVICE — ELCTR GROUNDING PAD ADULT COVIDIEN

## (undated) DEVICE — TUBING SUCTION 20FT

## (undated) DEVICE — SYR LUER LOK 50CC

## (undated) DEVICE — CATH IV SAFE BC 22G X 1" (BLUE)

## (undated) DEVICE — TUBING IV SET GRAVITY 3Y 100" MACRO

## (undated) DEVICE — BITE BLOCK ADULT 20 X 27MM (GREEN)

## (undated) DEVICE — SYR ALLIANCE II INFLATION 60ML

## (undated) DEVICE — BALLOON US ENDO

## (undated) DEVICE — CATH IV SAFE BC 20G X 1.16" (PINK)

## (undated) DEVICE — BIOPSY FORCEP RADIAL JAW 4 STANDARD WITH NEEDLE

## (undated) DEVICE — SNARE EXACTO COLD 9MMX230CM